# Patient Record
Sex: MALE | Race: WHITE | NOT HISPANIC OR LATINO | Employment: OTHER | URBAN - METROPOLITAN AREA
[De-identification: names, ages, dates, MRNs, and addresses within clinical notes are randomized per-mention and may not be internally consistent; named-entity substitution may affect disease eponyms.]

---

## 2018-01-04 ENCOUNTER — OFFICE VISIT CONVERTED (OUTPATIENT)
Dept: CARDIOLOGY | Facility: CLINIC | Age: 68
End: 2018-01-04
Attending: INTERNAL MEDICINE

## 2018-02-08 ENCOUNTER — OFFICE VISIT CONVERTED (OUTPATIENT)
Dept: CARDIOLOGY | Facility: CLINIC | Age: 68
End: 2018-02-08
Attending: INTERNAL MEDICINE

## 2018-02-16 ENCOUNTER — CONVERSION ENCOUNTER (OUTPATIENT)
Dept: CARDIOLOGY | Facility: CLINIC | Age: 68
End: 2018-02-16

## 2018-02-16 ENCOUNTER — OFFICE VISIT CONVERTED (OUTPATIENT)
Dept: CARDIOLOGY | Facility: CLINIC | Age: 68
End: 2018-02-16
Attending: INTERNAL MEDICINE

## 2018-03-02 ENCOUNTER — OFFICE VISIT CONVERTED (OUTPATIENT)
Dept: CARDIOLOGY | Facility: CLINIC | Age: 68
End: 2018-03-02
Attending: INTERNAL MEDICINE

## 2018-06-01 ENCOUNTER — OFFICE VISIT CONVERTED (OUTPATIENT)
Dept: CARDIOLOGY | Facility: CLINIC | Age: 68
End: 2018-06-01
Attending: INTERNAL MEDICINE

## 2018-11-02 ENCOUNTER — OFFICE VISIT CONVERTED (OUTPATIENT)
Dept: CARDIOLOGY | Facility: CLINIC | Age: 68
End: 2018-11-02
Attending: INTERNAL MEDICINE

## 2019-02-05 ENCOUNTER — OFFICE VISIT CONVERTED (OUTPATIENT)
Dept: CARDIOLOGY | Facility: CLINIC | Age: 69
End: 2019-02-05
Attending: INTERNAL MEDICINE

## 2019-02-06 ENCOUNTER — CONVERSION ENCOUNTER (OUTPATIENT)
Dept: CARDIOLOGY | Facility: CLINIC | Age: 69
End: 2019-02-06

## 2019-03-27 ENCOUNTER — OFFICE VISIT CONVERTED (OUTPATIENT)
Dept: SURGERY | Facility: CLINIC | Age: 69
End: 2019-03-27
Attending: NURSE PRACTITIONER

## 2019-04-08 ENCOUNTER — CONVERSION ENCOUNTER (OUTPATIENT)
Dept: ORTHOPEDIC SURGERY | Facility: CLINIC | Age: 69
End: 2019-04-08

## 2019-04-08 ENCOUNTER — OFFICE VISIT CONVERTED (OUTPATIENT)
Dept: ORTHOPEDIC SURGERY | Facility: CLINIC | Age: 69
End: 2019-04-08
Attending: ORTHOPAEDIC SURGERY

## 2019-05-16 ENCOUNTER — OFFICE VISIT CONVERTED (OUTPATIENT)
Dept: NEUROLOGY | Facility: CLINIC | Age: 69
End: 2019-05-16
Attending: PSYCHIATRY & NEUROLOGY

## 2019-05-22 ENCOUNTER — CONVERSION ENCOUNTER (OUTPATIENT)
Dept: ORTHOPEDIC SURGERY | Facility: CLINIC | Age: 69
End: 2019-05-22

## 2019-05-22 ENCOUNTER — OFFICE VISIT CONVERTED (OUTPATIENT)
Dept: ORTHOPEDIC SURGERY | Facility: CLINIC | Age: 69
End: 2019-05-22
Attending: PHYSICIAN ASSISTANT

## 2019-06-19 ENCOUNTER — HOSPITAL ENCOUNTER (OUTPATIENT)
Dept: PHYSICAL THERAPY | Facility: CLINIC | Age: 69
Setting detail: RECURRING SERIES
Discharge: STILL ADMITTED OR ADMINISTRATIVE | End: 2019-08-30

## 2019-07-26 ENCOUNTER — HOSPITAL ENCOUNTER (OUTPATIENT)
Dept: SURGERY | Facility: HOSPITAL | Age: 69
Setting detail: HOSPITAL OUTPATIENT SURGERY
Discharge: HOME | End: 2019-07-26
Attending: SURGERY

## 2019-07-26 LAB — GLUCOSE BLD-MCNC: 145 MG/DL (ref 70–99)

## 2019-08-15 ENCOUNTER — OFFICE VISIT CONVERTED (OUTPATIENT)
Dept: CARDIOLOGY | Facility: CLINIC | Age: 69
End: 2019-08-15
Attending: INTERNAL MEDICINE

## 2019-09-04 ENCOUNTER — HOSPITAL ENCOUNTER (OUTPATIENT)
Dept: PHYSICAL THERAPY | Facility: CLINIC | Age: 69
Setting detail: RECURRING SERIES
Discharge: STILL ADMITTED OR ADMINISTRATIVE | End: 2019-10-01

## 2019-09-18 ENCOUNTER — HOSPITAL ENCOUNTER (OUTPATIENT)
Dept: PHYSICAL THERAPY | Facility: CLINIC | Age: 69
Setting detail: RECURRING SERIES
Discharge: STILL ADMITTED OR ADMINISTRATIVE | End: 2019-12-20

## 2019-12-23 ENCOUNTER — HOSPITAL ENCOUNTER (OUTPATIENT)
Dept: PHYSICAL THERAPY | Facility: CLINIC | Age: 69
Setting detail: RECURRING SERIES
Discharge: STILL ADMITTED OR ADMINISTRATIVE | End: 2020-01-07

## 2020-01-07 ENCOUNTER — HOSPITAL ENCOUNTER (OUTPATIENT)
Dept: PHYSICAL THERAPY | Facility: CLINIC | Age: 70
Setting detail: RECURRING SERIES
Discharge: HOME | End: 2020-07-01

## 2020-03-05 ENCOUNTER — OFFICE VISIT CONVERTED (OUTPATIENT)
Dept: CARDIOLOGY | Facility: CLINIC | Age: 70
End: 2020-03-05
Attending: INTERNAL MEDICINE

## 2020-09-21 ENCOUNTER — OFFICE VISIT CONVERTED (OUTPATIENT)
Dept: CARDIOLOGY | Facility: CLINIC | Age: 70
End: 2020-09-21
Attending: INTERNAL MEDICINE

## 2021-04-22 ENCOUNTER — CONVERSION ENCOUNTER (OUTPATIENT)
Dept: CARDIOLOGY | Facility: CLINIC | Age: 71
End: 2021-04-22

## 2021-04-22 ENCOUNTER — OFFICE VISIT CONVERTED (OUTPATIENT)
Dept: CARDIOLOGY | Facility: CLINIC | Age: 71
End: 2021-04-22
Attending: INTERNAL MEDICINE

## 2021-05-11 NOTE — PROCEDURES
"   Procedure Note      Patient Name: Emmett Pandya   Patient ID: [id number]   Sex: Male   YOB: 1950    Primary Care Provider: Roscoe Harrison Jr. MD   Referring Provider: Roscoe Harrison Jr. MD    Visit Date: April 22, 2021    Provider: Marco Kramer MD   Location: [facility]   Location Address: [address] [place] [address], [address]   [place], [place]  [id number]   Location Phone: [phone]          FINAL REPORT   TRANSTHORACIC ECHOCARDIOGRAM REPORT    Diagnosis: Congestive heart failure, shortness of breath.   Height: 6'0\" Weight: 308 B/P: 158/78 BSA: 2.6   Tech: BNS   MEASUREMENTS:  RVID (Diastole) : RVID. (NORMAL: 0.7 to 2.4 cm max)   LVID (Systole): 3.9 cm (Diastole): 6.0 cm. (NORMAL: 3.7 - 5.4 cm)   Posterior Wall Thickness (Diastole): 1.3 cm. (NORMAL: 0.8 - 1.1 cm)   Septal Thickness (Diastole): 1.3 cm. (NORMAL: 0.7 - 1.2 cm)   LAID (Systole): 5.0 cm. (NORMAL: 1.9 - 3.8 cm)   Aortic Root Diameter (Diastole): 3.7 cm. (NORMAL: 2.0 - 3.7 cm)   DOPPLER: Continuous-wave, pulse-wave, and color-flow examination of the mitral, aortic, and tricuspid valves was performed. There was trace mitral regurgitation. Doppler flow velocities were normal. E/A ratio is 1.2. DT= 246 msec. IVRT is 74 msec. E/E' is 13.   COMMENTS:  The patient underwent 2-D, M-Mode, and Doppler examination, including pulse-wave, continuous-wave, and color-flow analysis; the study is technically adequate.   FINDINGS:  AORTIC VALVE: Appeared to be normal. Trileaflet with central closure point. No evidence of any obstruction. No regurgitation.   MITRAL VALVE: Appeared to be normal. No evidence of any obstruction. No regurgitation.   TRICUSPID VALVE: Appeared to be normal.   PULMONIC VALVE: Not well seen.   LEFT ATRIUM: Moderately enlarged. No intracavity masses or clots seen. LA volume index is 32 mL/m2.   AORTIC ROOT: Appeared to be normal in size.   LEFT VENTRICLE: Mild LV enlargement. Normal EF of 55%. Mild to moderate left " ventricular hypertrophy. No focal wall motion abnormalities.   RIGHT ATRIUM: Appeared to be normal; no obvious evidence of intracavity masses or clots.   RIGHT VENTRICLE: Normal size and function.   PERICARDIUM: Unremarkable. No evidence of effusion.   INFERIOR VENA CAVA: Diameter is 2.8 cm.   Fax: 04/27/2021      CONCLUSION:  1.  Normal ejection fraction of 55%.  2.  Mild left ventricular hypertrophy.  3.  Mild LV enlargement.  4.  Moderate left atrial enlargement.  5.  Trace mitral regurgitation.      Marco Kramer MD  /pap                    Electronically Signed by: Candelaria Tristan-, Other -Author on April 27, 2021 02:33:05 PM  Electronically Co-signed by: Marco Kramer MD -Reviewer on April 28, 2021 12:56:17 PM

## 2021-05-13 NOTE — PROGRESS NOTES
Progress Note      Patient Name: Emmett Pandya   Patient ID: [id number]   Sex: Male   YOB: 1950    Primary Care Provider: Roscoe Harrison Jr. MD   Referring Provider: Roscoe Harrison Jr. MD    Visit Date: September 21, 2020    Provider: Marco Kramer MD   [facility]   Location Address: [address] [place] [address], [facility]   [place], [place]  [id number]   Location Phone: [phone]          Chief Complaint  · Coronary artery disease       History Of Present Illness  REFERRING CARE PROVIDER: Roscoe Harrison Jr., MD   Emmett Pandya is a 69 year old /White gentleman with known coronary artery disease with previous stent, hypertension, cardiovascular risk factors, diastolic congestive heart failure with chronic lower-extremity edema issues. He has been having some increased shortness of breath and fatigue issues. The patient has chronic edema, which he states is stable. His weight is up about 3 pounds.   PAST MEDICAL HISTORY: Chronic renal insufficiency; diastolic congestive heart failure; coronary artery disease with prior stenting; gout; hyperlipidemia.   FAMILY HISTORY: Negative for diabetes, hypertension and heart disease.   PSYCHOSOCIAL HISTORY: No history of mood changes or depression. He rarely drinks alcohol. He previously used tobacco but quit.   CURRENT MEDICATIONS: include Coreg 12.5 mg b.i.d; Lasix 80 mg b.i.d.; Spironolactone 50 mg daily; Atorvastatin 40 mg daily; Lisinopril 40 mg daily; ASA 81 mg daily. The dosage and frequency of the medications were reviewed with the patient.       Review of Systems  · Cardiovascular  o Admits  o : swelling (feet, ankles, hands), shortness of breath while walking or lying flat  o Denies  o : palpitations (fast, fluttering, or skipping beats), chest pain or angina pectoris   · Respiratory  o Denies  o : chronic or frequent cough, asthma or wheezing      Vitals  Date Time BP Position Site L\R Cuff Size HR RR TEMP (F) WT  HT  BMI  kg/m2 BSA m2 O2 Sat        09/21/2020 12:57 /78 Sitting    64 - R   308lbs 0oz 6'   41.77 2.66     09/21/2020 12:57 /84 Sitting    60 - R                 Physical Examination  · Constitutional  o Appearance  o : Awake, alert, in no acute distress.   · Eyes  o Conjunctivae  o : Normal.  · Ears, Nose, Mouth and Throat  o Oral Cavity  o :   § Oral Mucosa  § : Normal.  · Neck  o Inspection/Palpation  o : No JVD. Good carotid upstroke. No thyromegaly.  · Respiratory  o Respiratory  o : Good respiratory effort. Clear to percussion and auscultation.  · Cardiovascular  o Heart  o :   § Auscultation of Heart  § : S1, S2 normal. Regular rate and rhythm without murmurs, gallops, or rubs.  o Peripheral Vascular System  o :   § Extremities  § : Good femoral and pedal pulses. +2 bilateral lower-extremity edema.  · Gastrointestinal  o Abdominal Examination  o : Soft. No tenderness or masses felt. No hepatosplenomegaly. Abdominal aorta is not palpable.          Assessment     ASSESSMENT AND PLAN:    1.  Diastolic congestive heart failure: Patient with stable weight and edema but some increasing shortness of       breath issues.  Recommended an echocardiogram for further workup.  The patient was concerned about       COVID and other issues and wished to defer this until his follow-up visit.  2.  Hypertension:  Elevated today.  Discussed with him adding an anti-hypertensive.  Patient was adamant that       at his recent IV fusion, his blood pressures have remained well controlled and that this was just white-coat       hypertension.  Counseled him again on a low-sodium diet.  Also gave him a blood pressure log to return for       review.    MD Chad Floyd/chhaya         This note was transcribed by Alma High.  [email or web address]/chad   The above service was transcribed by Alma High, and I attest to the accuracy of the note.  CHAD.               Electronically Signed by: Alma High-, -Author on September 25, 2020  05:43:08 AM  Electronically Co-signed by: Marco Kramer MD -Reviewer on September 29, 2020 01:37:46 PM

## 2021-05-14 VITALS
BODY MASS INDEX: 41.72 KG/M2 | HEIGHT: 72 IN | SYSTOLIC BLOOD PRESSURE: 158 MMHG | WEIGHT: 308 LBS | HEART RATE: 64 BPM | DIASTOLIC BLOOD PRESSURE: 78 MMHG

## 2021-05-14 VITALS
SYSTOLIC BLOOD PRESSURE: 168 MMHG | BODY MASS INDEX: 42.39 KG/M2 | HEIGHT: 72 IN | WEIGHT: 313 LBS | HEART RATE: 64 BPM | DIASTOLIC BLOOD PRESSURE: 82 MMHG

## 2021-05-14 NOTE — PROGRESS NOTES
Progress Note      Patient Name: Emmett Pandya   Patient ID: [id number]   Sex: Male   YOB: 1950    Primary Care Provider: Roscoe Harrison Jr. MD   Referring Provider: Roscoe Harrison Jr. MD    Visit Date: April 22, 2021    Provider: Marco Kramer MD   Location: [facility]   Location Address: [address] [place] [address], [facility]   [place], [place]  [id number]   Location Phone: [phone]          Chief Complaint     Congestive heart failure.       History Of Present Illness  REFERRING CARE PROVIDER: Roscoe Harrison Jr., MD   Emmett Pandya is a 70 year old /White male with CAD with previous stent, hypertension, diastolic congestive heart failure, and chronic lower extremity edema who has noticing some increased shortness of breath, especially with exertion, PND and orthopnea symptoms. he has had chronic lower extremity edema. He has had about a 5-pound weight gain since last visit.   PAST MEDICAL HISTORY: Chronic renal insufficiency; Diastolic congestive heart failure; Coronary artery disease with prior stenting; Gout; Hyperlipidemia.   PSYCHOSOCIAL HISTORY: Previously smoked but quit about 20 years ago. Denies alcohol use.   CURRENT MEDICATIONS: Spironolactone 50 mg daily; lisinopril 40 mg d; d furosemide 80 mg b.i.d.; carvedilol 12.5 mg b.i.d.; atorvastatin 40 mg daily.      ALLERGIES: No known drug allergies.       Review of Systems  · Cardiovascular  o Admits  o : shortness of breath while walking or lying flat  o Denies  o : palpitations (fast, fluttering, or skipping beats), swelling (feet, ankles, hands), chest pain or angina pectoris   · Respiratory  o Denies  o : chronic or frequent cough      Vitals  Date Time BP Position Site L\R Cuff Size HR RR TEMP (F) WT  HT  BMI kg/m2 BSA m2 O2 Sat FR L/min FiO2 HC       04/22/2021 01:10 /82 Sitting    64 - R   312lbs 16oz 6'   42.45 2.69              His home blood pressure log shows blood pressures primarily in the 140s to 150s  range.       Physical Examination  · Constitutional  o Appearance  o : Awake, alert, in no acute distress.   · Eyes  o Conjunctivae  o : Normal.  · Ears, Nose, Mouth and Throat  o Oral Cavity  o :   § Oral Mucosa  § : Normal.  · Neck  o Inspection/Palpation  o : No JVD. Good carotid upstroke. No thyromegaly.  · Respiratory  o Respiratory  o : Good respiratory effort. Clear to percussion and auscultation.  · Cardiovascular  o Heart  o :   § Auscultation of Heart  § : S1, S2 normal. Regular rate and rhythm without murmurs, gallops, or rubs.  o Peripheral Vascular System  o :   § Extremities  § : +2 bilateral lower extremity edema.   · Gastrointestinal  o Abdominal Examination  o : Soft. No tenderness or masses felt. No hepatosplenomegaly. Abdominal aorta is not palpable.          Assessment     ASSESSMENT & PLAN:    1.  Diastolic congestive heart failure with evidence of decompensation on exam as well as by symptoms.         Recommend Zaroxolyn 5 mg for three days and then go to p.r.n. dosing afterwards.  Continue with his        other diuretics.  Will see if further titration needed in the future.  2.  Hypertension.  Patient with mild elevation today in the office.  Recommended keeping a home blood        pressure log after further diuresis.  Continue with lisinopril 40 mg daily, Coreg 12.5 mg b.i.d., and        spironolactone 50 mg daily.             Electronically Signed by: Marce Pearson-, Other -Author on April 27, 2021 01:30:24 PM  Electronically Co-signed by: Marco Kramer MD -Reviewer on April 28, 2021 12:52:47 PM

## 2021-05-15 VITALS
WEIGHT: 298 LBS | DIASTOLIC BLOOD PRESSURE: 82 MMHG | HEIGHT: 72 IN | HEART RATE: 82 BPM | BODY MASS INDEX: 40.36 KG/M2 | SYSTOLIC BLOOD PRESSURE: 144 MMHG

## 2021-05-15 VITALS
HEART RATE: 84 BPM | HEIGHT: 71 IN | SYSTOLIC BLOOD PRESSURE: 190 MMHG | DIASTOLIC BLOOD PRESSURE: 82 MMHG | BODY MASS INDEX: 42.7 KG/M2 | WEIGHT: 305 LBS

## 2021-05-15 VITALS — HEART RATE: 81 BPM | HEIGHT: 72 IN | BODY MASS INDEX: 40.09 KG/M2 | OXYGEN SATURATION: 96 % | WEIGHT: 296 LBS

## 2021-05-15 VITALS — OXYGEN SATURATION: 97 % | WEIGHT: 296 LBS | BODY MASS INDEX: 40.09 KG/M2 | HEIGHT: 72 IN | HEART RATE: 85 BPM

## 2021-05-15 VITALS
WEIGHT: 288 LBS | DIASTOLIC BLOOD PRESSURE: 74 MMHG | BODY MASS INDEX: 40.32 KG/M2 | HEIGHT: 71 IN | HEART RATE: 88 BPM | SYSTOLIC BLOOD PRESSURE: 134 MMHG

## 2021-05-15 VITALS — HEIGHT: 72 IN | WEIGHT: 295.12 LBS | RESPIRATION RATE: 14 BRPM | BODY MASS INDEX: 39.97 KG/M2

## 2021-05-16 VITALS
WEIGHT: 315 LBS | SYSTOLIC BLOOD PRESSURE: 158 MMHG | DIASTOLIC BLOOD PRESSURE: 84 MMHG | HEART RATE: 66 BPM | BODY MASS INDEX: 42.66 KG/M2 | HEIGHT: 72 IN

## 2021-05-16 VITALS
BODY MASS INDEX: 42.66 KG/M2 | HEIGHT: 72 IN | WEIGHT: 315 LBS | SYSTOLIC BLOOD PRESSURE: 160 MMHG | DIASTOLIC BLOOD PRESSURE: 92 MMHG | HEART RATE: 72 BPM

## 2021-05-16 VITALS
DIASTOLIC BLOOD PRESSURE: 82 MMHG | HEART RATE: 68 BPM | HEIGHT: 72 IN | SYSTOLIC BLOOD PRESSURE: 160 MMHG | WEIGHT: 315 LBS | BODY MASS INDEX: 42.66 KG/M2

## 2021-05-16 VITALS
DIASTOLIC BLOOD PRESSURE: 54 MMHG | HEIGHT: 72 IN | WEIGHT: 315 LBS | HEART RATE: 66 BPM | BODY MASS INDEX: 42.66 KG/M2 | SYSTOLIC BLOOD PRESSURE: 124 MMHG

## 2021-05-16 VITALS
BODY MASS INDEX: 42.53 KG/M2 | HEIGHT: 72 IN | DIASTOLIC BLOOD PRESSURE: 74 MMHG | WEIGHT: 314 LBS | HEART RATE: 72 BPM | SYSTOLIC BLOOD PRESSURE: 130 MMHG

## 2021-05-16 VITALS
WEIGHT: 315 LBS | HEIGHT: 72 IN | DIASTOLIC BLOOD PRESSURE: 78 MMHG | SYSTOLIC BLOOD PRESSURE: 154 MMHG | BODY MASS INDEX: 42.66 KG/M2 | HEART RATE: 64 BPM

## 2021-07-14 RX ORDER — SPIRONOLACTONE 50 MG/1
TABLET, FILM COATED ORAL
Qty: 90 TABLET | Refills: 3 | Status: SHIPPED | OUTPATIENT
Start: 2021-07-14 | End: 2023-02-15 | Stop reason: DRUGHIGH

## 2021-09-28 RX ORDER — ATORVASTATIN CALCIUM 40 MG/1
TABLET, FILM COATED ORAL
Qty: 90 TABLET | Refills: 1 | Status: SHIPPED | OUTPATIENT
Start: 2021-09-28

## 2021-12-03 PROBLEM — I25.10 CAD S/P PERCUTANEOUS CORONARY ANGIOPLASTY: Status: ACTIVE | Noted: 2021-12-03

## 2021-12-03 PROBLEM — I10 ESSENTIAL HYPERTENSION: Status: ACTIVE | Noted: 2021-12-03

## 2021-12-03 PROBLEM — E78.5 HYPERLIPIDEMIA LDL GOAL <70: Status: ACTIVE | Noted: 2021-12-03

## 2021-12-03 PROBLEM — I50.32 CHRONIC HEART FAILURE WITH PRESERVED EJECTION FRACTION (HFPEF): Status: ACTIVE | Noted: 2021-12-03

## 2021-12-03 PROBLEM — Z98.61 CAD S/P PERCUTANEOUS CORONARY ANGIOPLASTY: Status: ACTIVE | Noted: 2021-12-03

## 2021-12-03 NOTE — PROGRESS NOTES
"Chief Complaint  Congestive Heart Failure and Hypertension    Subjective    Patient does not report any issues with worsening shortness of breath and no ongoing anginal discomfort    Past Medical History:   Diagnosis Date   • CHF (congestive heart failure) (HCC)    • Chronic kidney disease    • Hyperlipidemia    • Hypertension          Current Outpatient Medications:   •  aspirin 81 MG EC tablet, Take 81 mg by mouth Daily., Disp: , Rfl:   •  atorvastatin (LIPITOR) 40 MG tablet, TAKE 1 TABLET BY MOUTH EVERY DAY, Disp: 90 tablet, Rfl: 1  •  carvedilol (COREG) 12.5 MG tablet, Take 12.5 mg by mouth 2 (Two) Times a Day., Disp: , Rfl:   •  furosemide (LASIX) 80 MG tablet, Take 80 mg by mouth. 2 tabs po q am and 1 po qpm, Disp: , Rfl:   •  spironolactone (ALDACTONE) 50 MG tablet, TAKE 1 TABLET BY MOUTH EVERY DAY IN THE MORNING, Disp: 90 tablet, Rfl: 3  •  vitamin D (ERGOCALCIFEROL) 1.25 MG (12801 UT) capsule capsule, Every 7 (Seven) Days., Disp: , Rfl:   •  olmesartan-hydrochlorothiazide (BENICAR HCT) 40-12.5 MG per tablet, Take 1 tablet by mouth Daily., Disp: 90 tablet, Rfl: 3    Medications Discontinued During This Encounter   Medication Reason   • lisinopril (PRINIVIL,ZESTRIL) 40 MG tablet      No Known Allergies     Social History     Tobacco Use   • Smoking status: Former Smoker     Packs/day: 2.00     Types: Cigarettes     Start date: 1967     Quit date: 12/8/2006     Years since quitting: 15.0   • Smokeless tobacco: Never Used   Vaping Use   • Vaping Use: Never used   Substance Use Topics   • Alcohol use: Never   • Drug use: Never       Family History   Problem Relation Age of Onset   • No Known Problems Mother    • No Known Problems Father         Objective     /72   Pulse 58   Ht 182.9 cm (72\")   Wt (!) 139 kg (306 lb)   BMI 41.50 kg/m²       Physical Exam    General Appearance:   · no acute distress  · Alert and oriented x3  HENT:   · lips not cyanotic  · Atraumatic  Neck:  · No jvd "   · supple  Respiratory:  · no respiratory distress  · normal breath sounds  · no rales  Cardiovascular:  · Regular rate and rhythm  · no S3, no S4   · no murmur  · no rub  Extremities  · No cyanosis  lower extremity edema: +1-2 skin:   · warm, dry  · No rashes      Result Review :     No results found for: PROBNP       Lab Results   Component Value Date    TSH 3.000 08/09/2019      Lab Results   Component Value Date    FREET4 1.0 08/09/2019      No results found for: DDIMERQUANT  Magnesium   Date Value Ref Range Status   08/10/2019 2.24 1.60 - 2.30 mg/dL Final      No results found for: DIGOXIN   Lab Results   Component Value Date    TROPONINT 0.02 08/07/2019             No results found for: POCTROP    Creatinine 1.6   magnesium 2.1                 Diagnoses and all orders for this visit:    1. CAD S/P percutaneous coronary angioplasty (Primary)  Assessment & Plan:  Patient with no angina given chronic aspirin  81 mg once a day      2. Chronic heart failure with preserved ejection fraction (HFpEF) (HCC)  Assessment & Plan:  Patient's weight history to down about 7 pounds since his last visit stable symptomatically continue with Lasix 80 anticoagulopathy milligrams once a day.  Continue to encourage daily weight logs and low-sodium diet      3. Essential hypertension  Assessment & Plan:  Patient with mild systolic elevation will change lisinopril to olmesartan hydrochlorothiazide 40/25 mg and repeat a renal panel in 2 weeks    Orders:  -     Basic Metabolic Panel; Future    4. Hyperlipidemia LDL goal <70  Assessment & Plan:  Continue with Lipitor 40 nightly LDL at goal    Orders:  -     Lipid Panel; Future    Other orders  -     olmesartan-hydrochlorothiazide (BENICAR HCT) 40-12.5 MG per tablet; Take 1 tablet by mouth Daily.  Dispense: 90 tablet; Refill: 3          Follow Up     Return in about 6 months (around 6/8/2022) for EKG with F/U, Follow with Haven Helms.          Patient was given instructions and  counseling regarding his condition or for health maintenance advice. Please see specific information pulled into the AVS if appropriate.

## 2021-12-08 ENCOUNTER — OFFICE VISIT (OUTPATIENT)
Dept: CARDIOLOGY | Facility: CLINIC | Age: 71
End: 2021-12-08

## 2021-12-08 VITALS
WEIGHT: 306 LBS | SYSTOLIC BLOOD PRESSURE: 151 MMHG | DIASTOLIC BLOOD PRESSURE: 72 MMHG | HEART RATE: 58 BPM | HEIGHT: 72 IN | BODY MASS INDEX: 41.45 KG/M2

## 2021-12-08 DIAGNOSIS — E78.5 HYPERLIPIDEMIA LDL GOAL <70: ICD-10-CM

## 2021-12-08 DIAGNOSIS — I10 ESSENTIAL HYPERTENSION: ICD-10-CM

## 2021-12-08 DIAGNOSIS — Z98.61 CAD S/P PERCUTANEOUS CORONARY ANGIOPLASTY: Primary | ICD-10-CM

## 2021-12-08 DIAGNOSIS — I25.10 CAD S/P PERCUTANEOUS CORONARY ANGIOPLASTY: Primary | ICD-10-CM

## 2021-12-08 DIAGNOSIS — I50.32 CHRONIC HEART FAILURE WITH PRESERVED EJECTION FRACTION (HFPEF) (HCC): ICD-10-CM

## 2021-12-08 PROCEDURE — 99214 OFFICE O/P EST MOD 30 MIN: CPT | Performed by: INTERNAL MEDICINE

## 2021-12-08 RX ORDER — LISINOPRIL 40 MG/1
40 TABLET ORAL NIGHTLY
COMMUNITY
Start: 2021-10-09 | End: 2021-12-08

## 2021-12-08 RX ORDER — ERGOCALCIFEROL 1.25 MG/1
CAPSULE ORAL
COMMUNITY
Start: 2021-09-26

## 2021-12-08 RX ORDER — OLMESARTAN MEDOXOMIL AND HYDROCHLOROTHIAZIDE 40/12.5 40; 12.5 MG/1; MG/1
1 TABLET ORAL DAILY
Qty: 90 TABLET | Refills: 3 | Status: SHIPPED | OUTPATIENT
Start: 2021-12-08

## 2021-12-08 RX ORDER — ASPIRIN 81 MG/1
81 TABLET ORAL DAILY
COMMUNITY

## 2021-12-08 RX ORDER — FUROSEMIDE 80 MG
80 TABLET ORAL
COMMUNITY
Start: 2021-09-26

## 2021-12-08 RX ORDER — CARVEDILOL 12.5 MG/1
12.5 TABLET ORAL 2 TIMES DAILY
COMMUNITY
Start: 2021-10-09 | End: 2022-05-26

## 2021-12-08 NOTE — ASSESSMENT & PLAN NOTE
Patient's weight history to down about 7 pounds since his last visit stable symptomatically continue with Lasix 80 anticoagulopathy milligrams once a day.  Continue to encourage daily weight logs and low-sodium diet

## 2021-12-08 NOTE — ASSESSMENT & PLAN NOTE
Patient with mild systolic elevation will change lisinopril to olmesartan hydrochlorothiazide 40/25 mg and repeat a renal panel in 2 weeks

## 2022-05-26 RX ORDER — CARVEDILOL 12.5 MG/1
TABLET ORAL
Qty: 180 TABLET | Refills: 0 | Status: SHIPPED | OUTPATIENT
Start: 2022-05-26 | End: 2022-07-25

## 2022-06-22 ENCOUNTER — OFFICE VISIT (OUTPATIENT)
Dept: CARDIOLOGY | Facility: CLINIC | Age: 72
End: 2022-06-22

## 2022-06-22 VITALS
WEIGHT: 315 LBS | SYSTOLIC BLOOD PRESSURE: 146 MMHG | DIASTOLIC BLOOD PRESSURE: 62 MMHG | HEART RATE: 58 BPM | HEIGHT: 72 IN | BODY MASS INDEX: 42.66 KG/M2

## 2022-06-22 DIAGNOSIS — Z98.61 CAD S/P PERCUTANEOUS CORONARY ANGIOPLASTY: ICD-10-CM

## 2022-06-22 DIAGNOSIS — I50.32 CHRONIC HEART FAILURE WITH PRESERVED EJECTION FRACTION (HFPEF): Primary | ICD-10-CM

## 2022-06-22 DIAGNOSIS — E78.5 HYPERLIPIDEMIA LDL GOAL <70: ICD-10-CM

## 2022-06-22 DIAGNOSIS — I10 ESSENTIAL HYPERTENSION: ICD-10-CM

## 2022-06-22 DIAGNOSIS — I25.10 CAD S/P PERCUTANEOUS CORONARY ANGIOPLASTY: ICD-10-CM

## 2022-06-22 PROBLEM — M10.9 GOUT: Status: ACTIVE | Noted: 2021-12-16

## 2022-06-22 PROBLEM — N20.0 KIDNEY STONES: Status: ACTIVE | Noted: 2022-06-22

## 2022-06-22 PROBLEM — N18.30 STAGE 3 CHRONIC KIDNEY DISEASE (HCC): Status: ACTIVE | Noted: 2021-12-16

## 2022-06-22 PROBLEM — E55.9 VITAMIN D DEFICIENCY: Status: ACTIVE | Noted: 2021-12-16

## 2022-06-22 PROCEDURE — 99214 OFFICE O/P EST MOD 30 MIN: CPT | Performed by: NURSE PRACTITIONER

## 2022-06-22 NOTE — PROGRESS NOTES
Chief Complaint  Coronary Artery Disease    Subjective            History of Present Illness  Emmett Pandya is a 71-year-old white/ male patient who presents to the office today for follow-up.  He has chronic HFpEF, CAD status post stenting, hypertension, and hyperlipidemia.  He reports compliance with all of his medications.  He denies any chest pain, shortness of breath, lightheadedness/dizziness, palpitations, or edema.    PMH  Past Medical History:   Diagnosis Date   • CHF (congestive heart failure) (HCC)    • Chronic heart failure with preserved ejection fraction (HFpEF) 12/3/2021   • Chronic kidney disease    • Hyperlipidemia    • Hypertension          ALLERGY  No Known Allergies       SURGICALHX  Past Surgical History:   Procedure Laterality Date   • CARDIAC CATHETERIZATION     • CORONARY STENT PLACEMENT            SOC  Social History     Socioeconomic History   • Marital status: Single   Tobacco Use   • Smoking status: Former Smoker     Packs/day: 2.00     Types: Cigarettes     Start date: 1967     Quit date: 12/8/2006     Years since quitting: 15.5   • Smokeless tobacco: Never Used   Vaping Use   • Vaping Use: Never used   Substance and Sexual Activity   • Alcohol use: Never   • Drug use: Never   • Sexual activity: Defer         FAMHX  Family History   Problem Relation Age of Onset   • No Known Problems Mother    • No Known Problems Father           MEDSIGONLY  Current Outpatient Medications on File Prior to Visit   Medication Sig   • aspirin 81 MG EC tablet Take 81 mg by mouth Daily.   • atorvastatin (LIPITOR) 40 MG tablet TAKE 1 TABLET BY MOUTH EVERY DAY   • carvedilol (COREG) 12.5 MG tablet TAKE 1 TABLET BY MOUTH TWICE A DAY   • furosemide (LASIX) 80 MG tablet Take 80 mg by mouth. 2 tabs po q am and 1 po qpm   • olmesartan-hydrochlorothiazide (BENICAR HCT) 40-12.5 MG per tablet Take 1 tablet by mouth Daily.   • spironolactone (ALDACTONE) 50 MG tablet TAKE 1 TABLET BY MOUTH EVERY DAY IN THE  "MORNING   • vitamin D (ERGOCALCIFEROL) 1.25 MG (61366 UT) capsule capsule Every 7 (Seven) Days.     No current facility-administered medications on file prior to visit.         Objective   /62   Pulse 58   Ht 182.9 cm (72\")   Wt (!) 144 kg (317 lb)   BMI 42.99 kg/m²       Physical Exam  Constitutional:       Appearance: He is obese.   HENT:      Head: Normocephalic.   Neck:      Vascular: No carotid bruit.   Cardiovascular:      Rate and Rhythm: Normal rate and regular rhythm.      Pulses: Normal pulses.      Heart sounds: Normal heart sounds. No murmur heard.  Pulmonary:      Effort: Pulmonary effort is normal.      Breath sounds: Normal breath sounds.   Musculoskeletal:      Cervical back: Neck supple.      Right lower leg: No edema.      Left lower leg: No edema.   Skin:     General: Skin is dry.      Capillary Refill: Capillary refill takes less than 2 seconds.   Neurological:      Mental Status: He is alert and oriented to person, place, and time.   Psychiatric:         Behavior: Behavior normal.       Result Review :   The following data was reviewed by: LOTUS Murdock on 06/22/2022:  No results found for: PROBNP       Lab Results   Component Value Date    TSH 3.000 08/09/2019      Lab Results   Component Value Date    FREET4 1.0 08/09/2019      No results found for: DDIMERQUANT  Magnesium   Date Value Ref Range Status   08/10/2019 2.24 1.60 - 2.30 mg/dL Final      No results found for: DIGOXIN   Lab Results   Component Value Date    TROPONINT 0.02 08/07/2019 04/22/21 echo:  1.  Normal ejection fraction of 55%.  2.  Mild left ventricular hypertrophy.  3.  Mild LV enlargement.  4.  Moderate left atrial enlargement.  5.  Trace mitral regurgitation.       Assessment and Plan    Diagnoses and all orders for this visit:    1. Chronic heart failure with preserved ejection fraction (HFpEF) (Primary)  Symptomatically stable at this time and euvolemic on exam, continue carvedilol 12.5 mg twice " daily, Lasix 80 mg daily, olmesartan HCTZ 40/12.5 mg daily, and spironolactone 50 mg daily.    2. CAD S/P percutaneous coronary angioplasty  Currently denies any anginal symptoms, continue aspirin 81 mg daily.    3. Essential hypertension  Slightly elevated in office today, reports normal blood pressures at home, continue carvedilol 12.5 mg twice daily, olmesartan HCTZ 40/12.5 mg daily, and spironolactone 50 mg daily.  Low-sodium diet discussed.    4. Hyperlipidemia LDL goal <70  Last lipid panel is not available for review, obtain fasting lipid and hepatic function panel to assess statin effectiveness.  Continue atorvastatin 40 mg nightly for now.          Follow Up   Return in about 6 months (around 12/22/2022) for Follow up with Dr Kramer.    Patient was given instructions and counseling regarding his condition or for health maintenance advice. Please see specific information pulled into the AVS if appropriate.     Emmett Pandya  reports that he quit smoking about 15 years ago. His smoking use included cigarettes. He started smoking about 55 years ago. He smoked 2.00 packs per day. He has never used smokeless tobacco.           Haven Helms, LOTUS  06/22/22  10:34 EDT    Dictated Utilizing Dragon Dictation

## 2022-07-25 RX ORDER — CARVEDILOL 12.5 MG/1
TABLET ORAL
Qty: 180 TABLET | Refills: 2 | Status: SHIPPED | OUTPATIENT
Start: 2022-07-25

## 2023-02-15 ENCOUNTER — OFFICE VISIT (OUTPATIENT)
Dept: CARDIOLOGY | Facility: CLINIC | Age: 73
End: 2023-02-15
Payer: MEDICARE

## 2023-02-15 VITALS
HEIGHT: 72 IN | HEART RATE: 64 BPM | WEIGHT: 304 LBS | DIASTOLIC BLOOD PRESSURE: 80 MMHG | BODY MASS INDEX: 41.17 KG/M2 | SYSTOLIC BLOOD PRESSURE: 138 MMHG

## 2023-02-15 DIAGNOSIS — Z98.61 CAD S/P PERCUTANEOUS CORONARY ANGIOPLASTY: Primary | ICD-10-CM

## 2023-02-15 DIAGNOSIS — I50.32 CHRONIC HEART FAILURE WITH PRESERVED EJECTION FRACTION (HFPEF): ICD-10-CM

## 2023-02-15 DIAGNOSIS — E78.5 HYPERLIPIDEMIA LDL GOAL <70: ICD-10-CM

## 2023-02-15 DIAGNOSIS — I10 ESSENTIAL HYPERTENSION: ICD-10-CM

## 2023-02-15 DIAGNOSIS — I25.10 CAD S/P PERCUTANEOUS CORONARY ANGIOPLASTY: Primary | ICD-10-CM

## 2023-02-15 PROCEDURE — 99214 OFFICE O/P EST MOD 30 MIN: CPT | Performed by: NURSE PRACTITIONER

## 2023-02-15 RX ORDER — ALLOPURINOL 100 MG/1
100 TABLET ORAL DAILY
COMMUNITY

## 2023-02-15 RX ORDER — SPIRONOLACTONE 100 MG/1
100 TABLET, FILM COATED ORAL DAILY
COMMUNITY

## 2023-02-15 NOTE — PROGRESS NOTES
Chief Complaint  Follow-up, Coronary Artery Disease, Hyperlipidemia, and Hypertension    Subjective            History of Present Illness  Emmett Pandya is a 72-year-old white/ male patient who presents to the office today for follow-up.  He has chronic HFpEF, CAD, hypertension, and hyperlipidemia. He reports compliance with medications. He denies chest pain, shortness of breath, edema, lightheadedness/dizziness, or palpitations.  He reports that Dr. Morrell is wanting to start him on Farxiga or Jardiance as a kidney protection measure and is requesting recommendation from cardiology.    [facility]  Past Medical History:   Diagnosis Date   • CHF (congestive heart failure) (HCC)    • Chronic heart failure with preserved ejection fraction (HFpEF) 12/3/2021   • Chronic kidney disease    • Hyperlipidemia    • Hypertension          ALLERGY  No Known Allergies       SURGICALHX  Past Surgical History:   Procedure Laterality Date   • CARDIAC CATHETERIZATION     • CORONARY STENT PLACEMENT            SOC  Social History     Socioeconomic History   • Marital status: Single   Tobacco Use   • Smoking status: Former     Packs/day: 2.00     Types: Cigarettes     Start date:      Quit date: 2006     Years since quittin.2   • Smokeless tobacco: Never   Vaping Use   • Vaping Use: Never used   Substance and Sexual Activity   • Alcohol use: Never   • Drug use: Never   • Sexual activity: Defer         FAMHX  Family History   Problem Relation Age of Onset   • No Known Problems Mother    • No Known Problems Father           MEDSIGONLY  Current Outpatient Medications on File Prior to Visit   Medication Sig   • allopurinol (ZYLOPRIM) 100 MG tablet Take 100 mg by mouth Daily.   • aspirin 81 MG EC tablet Take 81 mg by mouth Daily.   • atorvastatin (LIPITOR) 40 MG tablet TAKE 1 TABLET BY MOUTH EVERY DAY   • carvedilol (COREG) 12.5 MG tablet TAKE 1 TABLET BY MOUTH TWICE A DAY   • furosemide (LASIX) 80 MG tablet Take 80 mg by  "mouth. 2 tabs po q am and 1 po qpm   • olmesartan-hydrochlorothiazide (BENICAR HCT) 40-12.5 MG per tablet Take 1 tablet by mouth Daily.   • spironolactone (ALDACTONE) 100 MG tablet Take 100 mg by mouth Daily.   • vitamin D (ERGOCALCIFEROL) 1.25 MG (33144 UT) capsule capsule Every 7 (Seven) Days.   • [DISCONTINUED] spironolactone (ALDACTONE) 50 MG tablet TAKE 1 TABLET BY MOUTH EVERY DAY IN THE MORNING (Patient taking differently: 100 mg.)     No current facility-administered medications on file prior to visit.       Objective   /80   Pulse 64   Ht 182.9 cm (72\")   Wt (!) 138 kg (304 lb)   BMI 41.23 kg/m²       Physical Exam  Constitutional:       Appearance: He is obese.   HENT:      Head: Normocephalic.   Neck:      Vascular: No carotid bruit.   Cardiovascular:      Rate and Rhythm: Normal rate and regular rhythm.      Pulses: Normal pulses.      Heart sounds: Normal heart sounds. No murmur heard.  Pulmonary:      Effort: Pulmonary effort is normal.      Breath sounds: Normal breath sounds.   Musculoskeletal:      Cervical back: Neck supple.      Right lower leg: No edema.      Left lower leg: No edema.   Skin:     General: Skin is dry.      Capillary Refill: Capillary refill takes less than 2 seconds.   Neurological:      Mental Status: He is alert and oriented to person, place, and time.   Psychiatric:         Behavior: Behavior normal.       Result Review :   The following data was reviewed by: LOTUS Murdock on 02/15/2023:  No results found for: PROBNP       Lab Results   Component Value Date    TSH 3.000 08/09/2019      Lab Results   Component Value Date    FREET4 1.0 08/09/2019      No results found for: DDIMERQUANT  Magnesium   Date Value Ref Range Status   08/10/2019 2.24 1.60 - 2.30 mg/dL Final      No results found for: DIGOXIN   Lab Results   Component Value Date    TROPONINT 0.02 08/07/2019                  Assessment and Plan    Diagnoses and all orders for this visit:    1. CAD S/P " percutaneous coronary angioplasty (Primary)  He denies any anginal symptoms, continue aspirin 81 mg daily.    2. Chronic heart failure with preserved ejection fraction (HFpEF)  Symptomatically stable and only mild fluid overload on exam today, continue carvedilol 12.5 mg twice daily, Lasix 80 mg daily, and spironolactone 100 mg daily.  Nephrology had to the spironolactone dose due to hypokalemia.  I called and spoke with Dr. Morrell and it was decided that Farxiga would be initiated today.  The patient is agreeable to this plan.    3. Essential hypertension  Currently controlled without adverse effects from medication, continue carvedilol 12.5 mg twice daily and olmesartan HCTZ 40-12.5 mg daily.    4. Hyperlipidemia LDL goal <70  Last lipid panel is unavailable for review, will request from PCP.  For now continue atorvastatin 40 mg daily.      Other orders  -     dapagliflozin Propanediol 10 MG tablet; Take 10 mg by mouth Daily.  Dispense: 90 tablet; Refill: 1        Follow Up   Return in about 6 months (around 8/15/2023) for Follow up with Dr Kramer.    Patient was given instructions and counseling regarding his condition or for health maintenance advice. Please see specific information pulled into the AVS if appropriate.     Emmett Pandya  reports that he quit smoking about 16 years ago. His smoking use included cigarettes. He started smoking about 56 years ago. He smoked an average of 2 packs per day. He has never used smokeless tobacco.           Haven Helms, APRN  02/15/23  08:12 EST    Dictated Utilizing Dragon Dictation

## 2023-07-13 ENCOUNTER — HOSPITAL ENCOUNTER (INPATIENT)
Facility: HOSPITAL | Age: 73
LOS: 3 days | Discharge: HOME | DRG: 641 | End: 2023-07-16
Attending: EMERGENCY MEDICINE | Admitting: INTERNAL MEDICINE
Payer: MEDICARE

## 2023-07-13 ENCOUNTER — APPOINTMENT (OUTPATIENT)
Dept: GENERAL RADIOLOGY | Facility: HOSPITAL | Age: 73
DRG: 641 | End: 2023-07-13
Payer: MEDICARE

## 2023-07-13 DIAGNOSIS — N18.9 ACUTE RENAL FAILURE SUPERIMPOSED ON CHRONIC KIDNEY DISEASE, UNSPECIFIED CKD STAGE, UNSPECIFIED ACUTE RENAL FAILURE TYPE: ICD-10-CM

## 2023-07-13 DIAGNOSIS — R11.2 NAUSEA AND VOMITING, UNSPECIFIED VOMITING TYPE: ICD-10-CM

## 2023-07-13 DIAGNOSIS — E87.5 ACUTE HYPERKALEMIA: Primary | ICD-10-CM

## 2023-07-13 DIAGNOSIS — Z78.9 DECREASED ACTIVITIES OF DAILY LIVING (ADL): ICD-10-CM

## 2023-07-13 DIAGNOSIS — R26.2 DIFFICULTY WALKING: ICD-10-CM

## 2023-07-13 DIAGNOSIS — N17.9 ACUTE RENAL FAILURE SUPERIMPOSED ON CHRONIC KIDNEY DISEASE, UNSPECIFIED CKD STAGE, UNSPECIFIED ACUTE RENAL FAILURE TYPE: ICD-10-CM

## 2023-07-13 LAB
ALBUMIN SERPL-MCNC: 3.9 G/DL (ref 3.5–5.2)
ALBUMIN SERPL-MCNC: 4.2 G/DL (ref 3.5–5.2)
ALBUMIN/GLOB SERPL: 1 G/DL
ALBUMIN/GLOB SERPL: 1.1 G/DL
ALP SERPL-CCNC: 100 U/L (ref 39–117)
ALP SERPL-CCNC: 102 U/L (ref 39–117)
ALT SERPL W P-5'-P-CCNC: 13 U/L (ref 1–41)
ALT SERPL W P-5'-P-CCNC: 14 U/L (ref 1–41)
ANION GAP SERPL CALCULATED.3IONS-SCNC: 15.8 MMOL/L (ref 5–15)
ANION GAP SERPL CALCULATED.3IONS-SCNC: 18.5 MMOL/L (ref 5–15)
ARTERIAL PATENCY WRIST A: POSITIVE
AST SERPL-CCNC: 11 U/L (ref 1–40)
AST SERPL-CCNC: 12 U/L (ref 1–40)
BASE EXCESS BLDA CALC-SCNC: -6.8 MMOL/L (ref -2–2)
BASOPHILS # BLD AUTO: 0.04 10*3/MM3 (ref 0–0.2)
BASOPHILS # BLD AUTO: 0.06 10*3/MM3 (ref 0–0.2)
BASOPHILS NFR BLD AUTO: 0.5 % (ref 0–1.5)
BASOPHILS NFR BLD AUTO: 0.6 % (ref 0–1.5)
BDY SITE: ABNORMAL
BILIRUB SERPL-MCNC: 1 MG/DL (ref 0–1.2)
BILIRUB SERPL-MCNC: 1.1 MG/DL (ref 0–1.2)
BUN SERPL-MCNC: 136 MG/DL (ref 8–23)
BUN SERPL-MCNC: 141 MG/DL (ref 8–23)
BUN/CREAT SERPL: 24.2 (ref 7–25)
BUN/CREAT SERPL: 24.8 (ref 7–25)
CALCIUM SPEC-SCNC: 10.6 MG/DL (ref 8.6–10.5)
CALCIUM SPEC-SCNC: 10.8 MG/DL (ref 8.6–10.5)
CHLORIDE SERPL-SCNC: 101 MMOL/L (ref 98–107)
CHLORIDE SERPL-SCNC: 103 MMOL/L (ref 98–107)
CO2 SERPL-SCNC: 14.5 MMOL/L (ref 22–29)
CO2 SERPL-SCNC: 17.2 MMOL/L (ref 22–29)
COHGB MFR BLD: 0 % (ref 0–1.5)
CREAT SERPL-MCNC: 5.48 MG/DL (ref 0.76–1.27)
CREAT SERPL-MCNC: 5.82 MG/DL (ref 0.76–1.27)
DEPRECATED RDW RBC AUTO: 61.8 FL (ref 37–54)
DEPRECATED RDW RBC AUTO: 64.5 FL (ref 37–54)
EGFRCR SERPLBLD CKD-EPI 2021: 10.5 ML/MIN/1.73
EGFRCR SERPLBLD CKD-EPI 2021: 9.8 ML/MIN/1.73
EOSINOPHIL # BLD AUTO: 0.12 10*3/MM3 (ref 0–0.4)
EOSINOPHIL # BLD AUTO: 0.15 10*3/MM3 (ref 0–0.4)
EOSINOPHIL NFR BLD AUTO: 1.2 % (ref 0.3–6.2)
EOSINOPHIL NFR BLD AUTO: 1.8 % (ref 0.3–6.2)
ERYTHROCYTE [DISTWIDTH] IN BLOOD BY AUTOMATED COUNT: 16.9 % (ref 12.3–15.4)
ERYTHROCYTE [DISTWIDTH] IN BLOOD BY AUTOMATED COUNT: 17.2 % (ref 12.3–15.4)
FHHB: 5.6 % (ref 0–5)
GLOBULIN UR ELPH-MCNC: 4 GM/DL
GLOBULIN UR ELPH-MCNC: 4.1 GM/DL
GLUCOSE BLDC GLUCOMTR-MCNC: 146 MG/DL (ref 70–99)
GLUCOSE SERPL-MCNC: 122 MG/DL (ref 65–99)
GLUCOSE SERPL-MCNC: 136 MG/DL (ref 65–99)
HCO3 BLDA-SCNC: 18.7 MMOL/L (ref 22–26)
HCT VFR BLD AUTO: 33.3 % (ref 37.5–51)
HCT VFR BLD AUTO: 34.1 % (ref 37.5–51)
HGB BLD-MCNC: 10.1 G/DL (ref 13–17.7)
HGB BLD-MCNC: 10.3 G/DL (ref 13–17.7)
HGB BLDA-MCNC: 11.3 G/DL (ref 13.8–16.4)
HOLD SPECIMEN: NORMAL
HOLD SPECIMEN: NORMAL
IMM GRANULOCYTES # BLD AUTO: 0.02 10*3/MM3 (ref 0–0.05)
IMM GRANULOCYTES # BLD AUTO: 0.03 10*3/MM3 (ref 0–0.05)
IMM GRANULOCYTES NFR BLD AUTO: 0.2 % (ref 0–0.5)
IMM GRANULOCYTES NFR BLD AUTO: 0.3 % (ref 0–0.5)
INHALED O2 CONCENTRATION: 21 %
LYMPHOCYTES # BLD AUTO: 0.87 10*3/MM3 (ref 0.7–3.1)
LYMPHOCYTES # BLD AUTO: 1.16 10*3/MM3 (ref 0.7–3.1)
LYMPHOCYTES NFR BLD AUTO: 14 % (ref 19.6–45.3)
LYMPHOCYTES NFR BLD AUTO: 8.5 % (ref 19.6–45.3)
MCH RBC QN AUTO: 30.4 PG (ref 26.6–33)
MCH RBC QN AUTO: 30.7 PG (ref 26.6–33)
MCHC RBC AUTO-ENTMCNC: 29.6 G/DL (ref 31.5–35.7)
MCHC RBC AUTO-ENTMCNC: 30.9 G/DL (ref 31.5–35.7)
MCV RBC AUTO: 102.7 FL (ref 79–97)
MCV RBC AUTO: 99.1 FL (ref 79–97)
METHGB BLD QL: 0.1 % (ref 0–1.5)
MODALITY: ABNORMAL
MONOCYTES # BLD AUTO: 0.45 10*3/MM3 (ref 0.1–0.9)
MONOCYTES # BLD AUTO: 0.63 10*3/MM3 (ref 0.1–0.9)
MONOCYTES NFR BLD AUTO: 4.4 % (ref 5–12)
MONOCYTES NFR BLD AUTO: 7.6 % (ref 5–12)
NEUTROPHILS NFR BLD AUTO: 6.3 10*3/MM3 (ref 1.7–7)
NEUTROPHILS NFR BLD AUTO: 75.9 % (ref 42.7–76)
NEUTROPHILS NFR BLD AUTO: 8.71 10*3/MM3 (ref 1.7–7)
NEUTROPHILS NFR BLD AUTO: 85 % (ref 42.7–76)
NRBC BLD AUTO-RTO: 0 /100 WBC (ref 0–0.2)
NRBC BLD AUTO-RTO: 0 /100 WBC (ref 0–0.2)
NT-PROBNP SERPL-MCNC: 195.9 PG/ML (ref 0–900)
OXYHGB MFR BLDV: 94.3 % (ref 94–99)
PCO2 BLDA: 37.2 MM HG (ref 35–45)
PH BLDA: 7.32 PH UNITS (ref 7.35–7.45)
PLATELET # BLD AUTO: 156 10*3/MM3 (ref 140–450)
PLATELET # BLD AUTO: 194 10*3/MM3 (ref 140–450)
PMV BLD AUTO: 11.8 FL (ref 6–12)
PMV BLD AUTO: 12.6 FL (ref 6–12)
PO2 BLD: 371 MM[HG] (ref 0–500)
PO2 BLDA: 77.9 MM HG (ref 80–100)
POTASSIUM SERPL-SCNC: 5.7 MMOL/L (ref 3.5–5.2)
POTASSIUM SERPL-SCNC: 7.2 MMOL/L (ref 3.5–5.2)
PROT SERPL-MCNC: 8 G/DL (ref 6–8.5)
PROT SERPL-MCNC: 8.2 G/DL (ref 6–8.5)
RBC # BLD AUTO: 3.32 10*6/MM3 (ref 4.14–5.8)
RBC # BLD AUTO: 3.36 10*6/MM3 (ref 4.14–5.8)
SAO2 % BLDCOA: 94.4 % (ref 95–99)
SODIUM SERPL-SCNC: 134 MMOL/L (ref 136–145)
SODIUM SERPL-SCNC: 136 MMOL/L (ref 136–145)
TROPONIN T SERPL HS-MCNC: 46 NG/L
WBC NRBC COR # BLD: 10.24 10*3/MM3 (ref 3.4–10.8)
WBC NRBC COR # BLD: 8.3 10*3/MM3 (ref 3.4–10.8)
WHOLE BLOOD HOLD COAG: NORMAL
WHOLE BLOOD HOLD SPECIMEN: NORMAL

## 2023-07-13 PROCEDURE — 25010000002 CALCIUM GLUCONATE-NACL 1-0.675 GM/50ML-% SOLUTION: Performed by: EMERGENCY MEDICINE

## 2023-07-13 PROCEDURE — 85025 COMPLETE CBC W/AUTO DIFF WBC: CPT | Performed by: STUDENT IN AN ORGANIZED HEALTH CARE EDUCATION/TRAINING PROGRAM

## 2023-07-13 PROCEDURE — 25010000002 HEPARIN (PORCINE) PER 1000 UNITS: Performed by: STUDENT IN AN ORGANIZED HEALTH CARE EDUCATION/TRAINING PROGRAM

## 2023-07-13 PROCEDURE — 93010 ELECTROCARDIOGRAM REPORT: CPT | Performed by: INTERNAL MEDICINE

## 2023-07-13 PROCEDURE — 83880 ASSAY OF NATRIURETIC PEPTIDE: CPT

## 2023-07-13 PROCEDURE — 84484 ASSAY OF TROPONIN QUANT: CPT

## 2023-07-13 PROCEDURE — 99223 1ST HOSP IP/OBS HIGH 75: CPT | Performed by: STUDENT IN AN ORGANIZED HEALTH CARE EDUCATION/TRAINING PROGRAM

## 2023-07-13 PROCEDURE — 93005 ELECTROCARDIOGRAM TRACING: CPT | Performed by: EMERGENCY MEDICINE

## 2023-07-13 PROCEDURE — 99285 EMERGENCY DEPT VISIT HI MDM: CPT

## 2023-07-13 PROCEDURE — 85025 COMPLETE CBC W/AUTO DIFF WBC: CPT

## 2023-07-13 PROCEDURE — 80053 COMPREHEN METABOLIC PANEL: CPT

## 2023-07-13 PROCEDURE — 82375 ASSAY CARBOXYHB QUANT: CPT | Performed by: STUDENT IN AN ORGANIZED HEALTH CARE EDUCATION/TRAINING PROGRAM

## 2023-07-13 PROCEDURE — 71045 X-RAY EXAM CHEST 1 VIEW: CPT

## 2023-07-13 PROCEDURE — 82805 BLOOD GASES W/O2 SATURATION: CPT | Performed by: STUDENT IN AN ORGANIZED HEALTH CARE EDUCATION/TRAINING PROGRAM

## 2023-07-13 PROCEDURE — 36415 COLL VENOUS BLD VENIPUNCTURE: CPT | Performed by: STUDENT IN AN ORGANIZED HEALTH CARE EDUCATION/TRAINING PROGRAM

## 2023-07-13 PROCEDURE — 83050 HGB METHEMOGLOBIN QUAN: CPT | Performed by: STUDENT IN AN ORGANIZED HEALTH CARE EDUCATION/TRAINING PROGRAM

## 2023-07-13 PROCEDURE — 93005 ELECTROCARDIOGRAM TRACING: CPT

## 2023-07-13 PROCEDURE — 80053 COMPREHEN METABOLIC PANEL: CPT | Performed by: STUDENT IN AN ORGANIZED HEALTH CARE EDUCATION/TRAINING PROGRAM

## 2023-07-13 PROCEDURE — 93005 ELECTROCARDIOGRAM TRACING: CPT | Performed by: STUDENT IN AN ORGANIZED HEALTH CARE EDUCATION/TRAINING PROGRAM

## 2023-07-13 PROCEDURE — 36600 WITHDRAWAL OF ARTERIAL BLOOD: CPT | Performed by: STUDENT IN AN ORGANIZED HEALTH CARE EDUCATION/TRAINING PROGRAM

## 2023-07-13 PROCEDURE — 63710000001 INSULIN REGULAR HUMAN PER 5 UNITS: Performed by: EMERGENCY MEDICINE

## 2023-07-13 PROCEDURE — 82948 REAGENT STRIP/BLOOD GLUCOSE: CPT

## 2023-07-13 RX ORDER — NITROGLYCERIN 0.4 MG/1
0.4 TABLET SUBLINGUAL
Status: DISCONTINUED | OUTPATIENT
Start: 2023-07-13 | End: 2023-07-16 | Stop reason: HOSPADM

## 2023-07-13 RX ORDER — BUMETANIDE 0.25 MG/ML
2 INJECTION INTRAMUSCULAR; INTRAVENOUS ONCE
Status: COMPLETED | OUTPATIENT
Start: 2023-07-13 | End: 2023-07-13

## 2023-07-13 RX ORDER — SODIUM CHLORIDE 9 MG/ML
75 INJECTION, SOLUTION INTRAVENOUS CONTINUOUS
Status: DISCONTINUED | OUTPATIENT
Start: 2023-07-13 | End: 2023-07-13

## 2023-07-13 RX ORDER — SODIUM CHLORIDE 0.9 % (FLUSH) 0.9 %
10 SYRINGE (ML) INJECTION AS NEEDED
Status: DISCONTINUED | OUTPATIENT
Start: 2023-07-13 | End: 2023-07-16 | Stop reason: HOSPADM

## 2023-07-13 RX ORDER — ONDANSETRON 2 MG/ML
4 INJECTION INTRAMUSCULAR; INTRAVENOUS EVERY 6 HOURS PRN
Status: DISCONTINUED | OUTPATIENT
Start: 2023-07-13 | End: 2023-07-16 | Stop reason: HOSPADM

## 2023-07-13 RX ORDER — AMOXICILLIN 250 MG
2 CAPSULE ORAL 2 TIMES DAILY
Status: DISCONTINUED | OUTPATIENT
Start: 2023-07-13 | End: 2023-07-16 | Stop reason: HOSPADM

## 2023-07-13 RX ORDER — BISACODYL 5 MG/1
5 TABLET, DELAYED RELEASE ORAL DAILY PRN
Status: DISCONTINUED | OUTPATIENT
Start: 2023-07-13 | End: 2023-07-16 | Stop reason: HOSPADM

## 2023-07-13 RX ORDER — FUROSEMIDE 80 MG
80 TABLET ORAL EVERY EVENING
COMMUNITY
End: 2023-07-16 | Stop reason: HOSPADM

## 2023-07-13 RX ORDER — DEXTROSE MONOHYDRATE 25 G/50ML
25 INJECTION, SOLUTION INTRAVENOUS ONCE
Status: COMPLETED | OUTPATIENT
Start: 2023-07-13 | End: 2023-07-13

## 2023-07-13 RX ORDER — SPIRONOLACTONE 50 MG/1
50 TABLET, FILM COATED ORAL DAILY
COMMUNITY
Start: 2023-05-15 | End: 2023-07-16 | Stop reason: HOSPADM

## 2023-07-13 RX ORDER — HEPARIN SODIUM 5000 [USP'U]/ML
5000 INJECTION, SOLUTION INTRAVENOUS; SUBCUTANEOUS EVERY 12 HOURS SCHEDULED
Status: DISCONTINUED | OUTPATIENT
Start: 2023-07-13 | End: 2023-07-16 | Stop reason: HOSPADM

## 2023-07-13 RX ORDER — NICOTINE POLACRILEX 4 MG
15 LOZENGE BUCCAL
Status: DISCONTINUED | OUTPATIENT
Start: 2023-07-13 | End: 2023-07-16 | Stop reason: HOSPADM

## 2023-07-13 RX ORDER — CALCIUM GLUCONATE 20 MG/ML
1000 INJECTION, SOLUTION INTRAVENOUS ONCE
Status: COMPLETED | OUTPATIENT
Start: 2023-07-13 | End: 2023-07-13

## 2023-07-13 RX ORDER — INSULIN LISPRO 100 [IU]/ML
2-7 INJECTION, SOLUTION INTRAVENOUS; SUBCUTANEOUS
Status: DISCONTINUED | OUTPATIENT
Start: 2023-07-13 | End: 2023-07-16 | Stop reason: HOSPADM

## 2023-07-13 RX ORDER — OLMESARTAN MEDOXOMIL 40 MG/1
40 TABLET ORAL DAILY
COMMUNITY
Start: 2023-05-13 | End: 2023-07-16 | Stop reason: HOSPADM

## 2023-07-13 RX ORDER — SODIUM CHLORIDE 9 MG/ML
40 INJECTION, SOLUTION INTRAVENOUS AS NEEDED
Status: DISCONTINUED | OUTPATIENT
Start: 2023-07-13 | End: 2023-07-16 | Stop reason: HOSPADM

## 2023-07-13 RX ORDER — LISINOPRIL 30 MG/1
30 TABLET ORAL DAILY
COMMUNITY
Start: 2023-06-13 | End: 2023-07-16 | Stop reason: HOSPADM

## 2023-07-13 RX ORDER — DEXTROSE MONOHYDRATE 25 G/50ML
25 INJECTION, SOLUTION INTRAVENOUS
Status: DISCONTINUED | OUTPATIENT
Start: 2023-07-13 | End: 2023-07-16 | Stop reason: HOSPADM

## 2023-07-13 RX ORDER — POLYETHYLENE GLYCOL 3350 17 G/17G
17 POWDER, FOR SOLUTION ORAL DAILY PRN
Status: DISCONTINUED | OUTPATIENT
Start: 2023-07-13 | End: 2023-07-16 | Stop reason: HOSPADM

## 2023-07-13 RX ORDER — BISACODYL 10 MG
10 SUPPOSITORY, RECTAL RECTAL DAILY PRN
Status: DISCONTINUED | OUTPATIENT
Start: 2023-07-13 | End: 2023-07-16 | Stop reason: HOSPADM

## 2023-07-13 RX ORDER — SODIUM CHLORIDE 0.9 % (FLUSH) 0.9 %
10 SYRINGE (ML) INJECTION EVERY 12 HOURS SCHEDULED
Status: DISCONTINUED | OUTPATIENT
Start: 2023-07-13 | End: 2023-07-16 | Stop reason: HOSPADM

## 2023-07-13 RX ADMIN — HEPARIN SODIUM 5000 UNITS: 5000 INJECTION INTRAVENOUS; SUBCUTANEOUS at 23:06

## 2023-07-13 RX ADMIN — SODIUM BICARBONATE 50 MEQ: 84 INJECTION INTRAVENOUS at 19:38

## 2023-07-13 RX ADMIN — INSULIN HUMAN 7 UNITS: 100 INJECTION, SOLUTION PARENTERAL at 19:42

## 2023-07-13 RX ADMIN — CALCIUM GLUCONATE 1000 MG: 20 INJECTION, SOLUTION INTRAVENOUS at 19:24

## 2023-07-13 RX ADMIN — DEXTROSE MONOHYDRATE 25 G: 25 INJECTION, SOLUTION INTRAVENOUS at 19:40

## 2023-07-13 RX ADMIN — BUMETANIDE 2 MG: 0.25 INJECTION, SOLUTION INTRAMUSCULAR; INTRAVENOUS at 19:43

## 2023-07-13 RX ADMIN — Medication 10 ML: at 23:13

## 2023-07-13 RX ADMIN — SODIUM CHLORIDE 1000 ML: 9 INJECTION, SOLUTION INTRAVENOUS at 17:43

## 2023-07-13 RX ADMIN — SODIUM ZIRCONIUM CYCLOSILICATE 10 G: 10 POWDER, FOR SUSPENSION ORAL at 19:45

## 2023-07-13 RX ADMIN — SODIUM BICARBONATE 50 MEQ: 84 INJECTION, SOLUTION INTRAVENOUS at 23:12

## 2023-07-13 NOTE — ED PROVIDER NOTES
Time: 7:01 PM EDT  Date of encounter:  7/13/2023  Independent Historian/Clinical History and Information was obtained by:   Patient and Family    History is limited by: N/A    Chief Complaint: Dizziness, weakness, nausea and vomiting      History of Present Illness:  Patient is a 72 y.o. year old male with history of chronic kidney disease who presents to the emergency department for evaluation of weakness and dizziness and lightheadedness and also nausea and vomiting feeling poorly for the past week or so.    He was started on metformin a couple weeks ago and states he had to actually stop taking it because he feels like it was making him get sick.    Denies any fevers.  No chest pains.  No abdominal pain    Still making urine.    HPI    Patient Care Team  Primary Care Provider: Roscoe Harrison Jr., MD    Past Medical History:     No Known Allergies  Past Medical History:   Diagnosis Date    CHF (congestive heart failure)     Chronic heart failure with preserved ejection fraction (HFpEF) 12/3/2021    Chronic kidney disease     Hyperlipidemia     Hypertension      Past Surgical History:   Procedure Laterality Date    CARDIAC CATHETERIZATION      CORONARY STENT PLACEMENT       Family History   Problem Relation Age of Onset    No Known Problems Mother     No Known Problems Father        Home Medications:  Prior to Admission medications    Medication Sig Start Date End Date Taking? Authorizing Provider   allopurinol (ZYLOPRIM) 100 MG tablet Take 100 mg by mouth Daily.    Provider, MD Manny   aspirin 81 MG EC tablet Take 81 mg by mouth Daily.    Provider, MD Manny   atorvastatin (LIPITOR) 40 MG tablet TAKE 1 TABLET BY MOUTH EVERY DAY 9/28/21   Haven Helms APRN   carvedilol (COREG) 12.5 MG tablet TAKE 1 TABLET BY MOUTH TWICE A DAY 7/25/22   Marco Kramer MD   dapagliflozin Propanediol 10 MG tablet Take 10 mg by mouth Daily. 2/15/23   Haven Helms APRN   furosemide (LASIX) 80 MG tablet  "Take 80 mg by mouth. 2 tabs po q am and 1 po qpm 21   ProviderManny MD   olmesartan-hydrochlorothiazide (BENICAR HCT) 40-12.5 MG per tablet Take 1 tablet by mouth Daily. 21   Marco Kramer MD   spironolactone (ALDACTONE) 100 MG tablet Take 100 mg by mouth Daily.    Manny Bello MD   vitamin D (ERGOCALCIFEROL) 1.25 MG (12852 UT) capsule capsule Every 7 (Seven) Days. 21   Manny Bello MD        Social History:   Social History     Tobacco Use    Smoking status: Former     Packs/day: 2.00     Types: Cigarettes     Start date:      Quit date: 2006     Years since quittin.6    Smokeless tobacco: Never   Vaping Use    Vaping Use: Never used   Substance Use Topics    Alcohol use: Never    Drug use: Never         Review of Systems:  Review of Systems   I performed a 10 point review of systems which was all negative, except for the positives found in the HPI above.      Physical Exam:  /43   Pulse 60   Temp 97.4 °F (36.3 °C) (Oral)   Resp 18   Ht 182.9 cm (72\")   Wt 129 kg (285 lb 0.9 oz)   SpO2 95%   BMI 38.66 kg/m²         Physical Exam   General: Awake alert and in mild distress, looks to be feeling generally uncomfortable but not in any pain    HEENT: Head normocephalic atraumatic, eyes PERRLA EOMI, nose normal, oropharynx normal.    Neck: Supple full range of motion, no meningismus, no lymphadenopathy    Heart: Regular rate and rhythm, no murmurs or rubs, 2+ radial pulses bilaterally    Lungs: Clear to auscultation bilaterally without wheezes or crackles, no respiratory distress    Abdomen: Soft, nontender, nondistended, no rebound or guarding    Skin: Warm, dry, no rash    Musculoskeletal: Normal range of motion, mild 1+ bilateral lower extremity edema    Neurologic: Oriented x3, no motor deficits no sensory deficits    Psychiatric: Mood appears stable, no psychosis          Procedures:  Procedures      Medical Decision Making:      Comorbidities that " affect care:    Chronic Kidney Disease, Diabetes    External Notes reviewed:    Previous Labs: I reviewed his previous creatinine from a year or 2 ago measuring 2.4, whereas today's creatinine is 5.8, indicative of acute on chronic renal failure.      The following orders were placed and all results were independently analyzed by me:  Orders Placed This Encounter   Procedures    XR Chest 1 View    [facility] Draw    Comprehensive Metabolic Panel    BNP    Single High Sensitivity Troponin T    CBC Auto Differential    NPO Diet NPO Type: Strict NPO    Undress & Gown    Continuous Pulse Oximetry    Vital Signs    Nephrology  (on-call MD unless specified)    Hospitalist (on-call MD unless specified)    Oxygen Therapy- Nasal Cannula; Titrate 1-6 LPM Per SpO2; 90 - 95%    POC Glucose Once    ECG 12 Lead ED Triage Standing Order; SOA    Insert Peripheral IV    CBC & Differential    Green Top (Gel)    Lavender Top    Gold Top - SST    Light Blue Top       Medications Given in the Emergency Department:  Medications   sodium chloride 0.9 % flush 10 mL (has no administration in time range)   bumetanide (BUMEX) injection 2 mg (has no administration in time range)   sodium zirconium cyclosilicate (LOKELMA) pack 10 g (has no administration in time range)   sodium chloride 0.9 % bolus 1,000 mL (0 mL Intravenous Stopped 7/13/23 1934)   sodium bicarbonate injection 8.4% 50 mEq (50 mEq Intravenous Given 7/13/23 1938)   calcium gluconate 1000 Mg/50ml 0.675% NaCl IV SOLN (0 mg Intravenous Stopped 7/13/23 1938)   insulin regular (humuLIN R,novoLIN R) injection 7 Units (7 Units Intravenous Given 7/13/23 1942)   dextrose (D50W) (25 g/50 mL) IV injection 25 g (25 g Intravenous Given 7/13/23 1940)        ED Course:    ED Course as of 07/13/23 1943   Thu Jul 13, 2023 1909 EKG: I interpreted his twelve-lead EKG is normal sinus rhythm at 62 bpm, normal P waves, normal QRS, normal ST segments, mildly peaked T waves. [VS]      ED Course User  Index  [VS] Wm Braga MD       Labs:    Lab Results (last 24 hours)       Procedure Component Value Units Date/Time    CBC & Differential [[id number]]  (Abnormal) Collected: 07/13/23 1615    Specimen: Blood Updated: 07/13/23 1623    Narrative:      The following orders were created for panel order CBC & Differential.  Procedure                               Abnormality         Status                     ---------                               -----------         ------                     CBC Auto Differential[[id number]]        Abnormal            Final result                 Please view results for these tests on the individual orders.    BNP [[id number]]  (Normal) Collected: 07/13/23 1615    Specimen: Blood Updated: 07/13/23 1646     proBNP 195.9 pg/mL     Narrative:      Among patients with dyspnea, NT-proBNP is highly sensitive for the detection of acute congestive heart failure. In addition NT-proBNP of <300 pg/ml effectively rules out acute congestive heart failure with 99% negative predictive value.      CBC Auto Differential [[id number]]  (Abnormal) Collected: 07/13/23 1615    Specimen: Blood Updated: 07/13/23 1623     WBC 10.24 10*3/mm3      RBC 3.36 10*6/mm3      Hemoglobin 10.3 g/dL      Hematocrit 33.3 %      MCV 99.1 fL      MCH 30.7 pg      MCHC 30.9 g/dL      RDW 17.2 %      RDW-SD 61.8 fl      MPV 12.6 fL      Platelets 194 10*3/mm3      Neutrophil % 85.0 %      Lymphocyte % 8.5 %      Monocyte % 4.4 %      Eosinophil % 1.2 %      Basophil % 0.6 %      Immature Grans % 0.3 %      Neutrophils, Absolute 8.71 10*3/mm3      Lymphocytes, Absolute 0.87 10*3/mm3      Monocytes, Absolute 0.45 10*3/mm3      Eosinophils, Absolute 0.12 10*3/mm3      Basophils, Absolute 0.06 10*3/mm3      Immature Grans, Absolute 0.03 10*3/mm3      nRBC 0.0 /100 WBC     POC Glucose Once [[id number]]  (Abnormal) Collected: 07/13/23 1617    Specimen: Blood Updated: 07/13/23 1618     Glucose 146 mg/dL      Comment: Serial Number:  [id number]:  [id number]       Comprehensive Metabolic Panel [[id number]]  (Abnormal) Collected: 07/13/23 1733    Specimen: Blood Updated: 07/13/23 1843     Glucose 136 mg/dL       mg/dL      Creatinine 5.82 mg/dL      Sodium 134 mmol/L      Potassium 7.2 mmol/L      Chloride 101 mmol/L      CO2 17.2 mmol/L      Calcium 10.6 mg/dL      Total Protein 8.2 g/dL      Albumin 4.2 g/dL      ALT (SGPT) 13 U/L      AST (SGOT) 11 U/L      Alkaline Phosphatase 102 U/L      Total Bilirubin 1.0 mg/dL      Globulin 4.0 gm/dL      A/G Ratio 1.1 g/dL      BUN/Creatinine Ratio 24.2     Anion Gap 15.8 mmol/L      eGFR 9.8 mL/min/1.73      Comment: <15 Indicative of kidney failure       Narrative:      GFR Normal >60  Chronic Kidney Disease <60  Kidney Failure <15      Single High Sensitivity Troponin T [[id number]]  (Abnormal) Collected: 07/13/23 1733    Specimen: Blood Updated: 07/13/23 1827     HS Troponin T 46 ng/L     Narrative:      High Sensitive Troponin T Reference Range:  <10.0 ng/L- Negative Female for AMI  <15.0 ng/L- Negative Male for AMI  >=10 - Abnormal Female indicating possible myocardial injury.  >=15 - Abnormal Male indicating possible myocardial injury.   Clinicians would have to utilize clinical acumen, EKG, Troponin, and serial changes to determine if it is an Acute Myocardial Infarction or myocardial injury due to an underlying chronic condition.                  Imaging:    XR Chest 1 View    Result Date: 7/13/2023  PROCEDURE: XR CHEST 1 VW  COMPARISON: [facility], CR, CHEST AP/PA 1 VIEW, 8/07/2019, 10:58.  INDICATIONS: SHORTNESS OF BREATH  FINDINGS:  Lungs are clear bilaterally. Cardiac and mediastinal contours within normal limits. Regional skeleton within normal limits for age.         1. No acute cardiopulmonary disease.       GAYATHRI RONDON MD       Electronically Signed and Approved By: GAYATHRI RONDON MD on 7/13/2023 at 16:55                Differential Diagnosis and  Discussion:    Vomiting: Differential diagnosis includes but is not limited to migraine, labyrinthine disorders, psychogenic, metabolic and endocrine causes, peptic ulcer, gastric outlet obstruction, gastritis, gastroenteritis, appendicitis, intestinal obstruction, paralytic ileus, food poisoning, cholecystitis, acute hepatitis, acute pancreatitis, acute febrile illness, and myocardial infarction.  Weakness: Based on the patient's history, signs, and symptoms, the diffential diagnosis includes but is not limited to meningitis, stroke, sepsis, subarachnoid hemorrhage, intracranial bleeding, encephalitis, acute uti, dehydration, MS, myasthenia gravis, Guillan [place], migraine variant, neuromuscular disorders vertigo, electrolyte imbalance, and metabolic disorders.    All labs were reviewed and interpreted by me.  All X-rays impressions were independently interpreted by me.  EKG was interpreted by me.    MDM     Amount and/or Complexity of Data Reviewed  Decide to obtain previous medical records or to obtain history from someone other than the patient: yes           This patient is a 72-year-old male with chronic kidney disease recently started on metformin a couple weeks ago now presents with nausea vomiting and feeling generally weak and dizzy.    He is noted to be in acute on chronic renal failure with creatinine of 5.8 and BUN greater than 100 and some metabolic acidosis, and his serum potassium was significantly elevated at 7.2 today.    He does have some mild EKG changes consistent with hyperkalemia as well.    I am treating him emergently for his hyperkalemia with IV calcium gluconate, sodium bicarbonate, insulin and dextrose, Bumex and Lokelma.    I have been in consultation with the patient's nephrologist and we will plan to admit him to a monitored bed today, possibly start him out in the ICU initially.            Critical Care Note: Total Critical Care time of 35 minutes. Total critical care time documented  does not include time spent on separately billed procedures for services of nurses or physician assistants. I personally saw and examined the patient. I have reviewed all diagnostic interpretations and treatment plans as written. I was present for the key portions of any procedures performed and the inclusive time noted in any critical care statement. Critical care time includes patient management by me, time spent at the patients bedside,  time to review lab and imaging results, discussing patient care, documentation in the medical record, and time spent with family or caregiver.    Patient Care Considerations:          Consultants/Shared Management Plan:    Hospitalist: I have discussed the case with the admitting hospitalist, who agrees to accept the patient for admission.  Consultant: I have discussed the case with patient's nephrologist, who agrees to consult on the patient.    Social Determinants of Health:    Patient is independent, reliable, and has access to care.       Disposition and Care Coordination:    Admit:   Through independent evaluation of the patient's history, physical, and imperical data, the patient meets criteria for observation/admission to the hospital.        Final diagnoses:   Acute hyperkalemia   Acute renal failure superimposed on chronic kidney disease, unspecified CKD stage, unspecified acute renal failure type   Nausea and vomiting, unspecified vomiting type        ED Disposition       ED Disposition   Decision to Admit    Condition   --    Comment   --               This medical record created using voice recognition software.             Wm Braga MD  07/13/23 3023

## 2023-07-14 LAB
ALBUMIN SERPL-MCNC: 4.1 G/DL (ref 3.5–5.2)
ALBUMIN/GLOB SERPL: 1 G/DL
ALP SERPL-CCNC: 104 U/L (ref 39–117)
ALT SERPL W P-5'-P-CCNC: 12 U/L (ref 1–41)
ANION GAP SERPL CALCULATED.3IONS-SCNC: 17.6 MMOL/L (ref 5–15)
AST SERPL-CCNC: 11 U/L (ref 1–40)
BASOPHILS # BLD AUTO: 0.05 10*3/MM3 (ref 0–0.2)
BASOPHILS NFR BLD AUTO: 0.4 % (ref 0–1.5)
BILIRUB SERPL-MCNC: 1.1 MG/DL (ref 0–1.2)
BUN SERPL-MCNC: 133 MG/DL (ref 8–23)
BUN/CREAT SERPL: 26.1 (ref 7–25)
CALCIUM SPEC-SCNC: 10.6 MG/DL (ref 8.6–10.5)
CHLORIDE SERPL-SCNC: 101 MMOL/L (ref 98–107)
CO2 SERPL-SCNC: 19.4 MMOL/L (ref 22–29)
CREAT SERPL-MCNC: 5.09 MG/DL (ref 0.76–1.27)
D-LACTATE SERPL-SCNC: 1.1 MMOL/L (ref 0.5–2)
DEPRECATED RDW RBC AUTO: 57.2 FL (ref 37–54)
EGFRCR SERPLBLD CKD-EPI 2021: 11.5 ML/MIN/1.73
EOSINOPHIL # BLD AUTO: 0.25 10*3/MM3 (ref 0–0.4)
EOSINOPHIL NFR BLD AUTO: 2.1 % (ref 0.3–6.2)
ERYTHROCYTE [DISTWIDTH] IN BLOOD BY AUTOMATED COUNT: 16.9 % (ref 12.3–15.4)
GLOBULIN UR ELPH-MCNC: 4.1 GM/DL
GLUCOSE BLDC GLUCOMTR-MCNC: 113 MG/DL (ref 70–99)
GLUCOSE BLDC GLUCOMTR-MCNC: 115 MG/DL (ref 70–99)
GLUCOSE BLDC GLUCOMTR-MCNC: 120 MG/DL (ref 70–99)
GLUCOSE BLDC GLUCOMTR-MCNC: 138 MG/DL (ref 70–99)
GLUCOSE BLDC GLUCOMTR-MCNC: 82 MG/DL (ref 70–99)
GLUCOSE SERPL-MCNC: 121 MG/DL (ref 65–99)
HCT VFR BLD AUTO: 32.8 % (ref 37.5–51)
HGB BLD-MCNC: 10.6 G/DL (ref 13–17.7)
IMM GRANULOCYTES # BLD AUTO: 0.03 10*3/MM3 (ref 0–0.05)
IMM GRANULOCYTES NFR BLD AUTO: 0.2 % (ref 0–0.5)
LYMPHOCYTES # BLD AUTO: 1.31 10*3/MM3 (ref 0.7–3.1)
LYMPHOCYTES NFR BLD AUTO: 10.8 % (ref 19.6–45.3)
MCH RBC QN AUTO: 30.3 PG (ref 26.6–33)
MCHC RBC AUTO-ENTMCNC: 32.3 G/DL (ref 31.5–35.7)
MCV RBC AUTO: 93.7 FL (ref 79–97)
MONOCYTES # BLD AUTO: 0.83 10*3/MM3 (ref 0.1–0.9)
MONOCYTES NFR BLD AUTO: 6.8 % (ref 5–12)
NEUTROPHILS NFR BLD AUTO: 79.7 % (ref 42.7–76)
NEUTROPHILS NFR BLD AUTO: 9.71 10*3/MM3 (ref 1.7–7)
NRBC BLD AUTO-RTO: 0 /100 WBC (ref 0–0.2)
PLATELET # BLD AUTO: 188 10*3/MM3 (ref 140–450)
PMV BLD AUTO: 11.8 FL (ref 6–12)
POTASSIUM SERPL-SCNC: 4.9 MMOL/L (ref 3.5–5.2)
POTASSIUM SERPL-SCNC: 5.4 MMOL/L (ref 3.5–5.2)
POTASSIUM SERPL-SCNC: 5.4 MMOL/L (ref 3.5–5.2)
POTASSIUM SERPL-SCNC: 5.6 MMOL/L (ref 3.5–5.2)
PROT SERPL-MCNC: 8.2 G/DL (ref 6–8.5)
QT INTERVAL: 395 MS
QT INTERVAL: 396 MS
RBC # BLD AUTO: 3.5 10*6/MM3 (ref 4.14–5.8)
SODIUM SERPL-SCNC: 138 MMOL/L (ref 136–145)
WBC NRBC COR # BLD: 12.18 10*3/MM3 (ref 3.4–10.8)

## 2023-07-14 PROCEDURE — 99233 SBSQ HOSP IP/OBS HIGH 50: CPT | Performed by: INTERNAL MEDICINE

## 2023-07-14 PROCEDURE — 97161 PT EVAL LOW COMPLEX 20 MIN: CPT

## 2023-07-14 PROCEDURE — 80053 COMPREHEN METABOLIC PANEL: CPT | Performed by: STUDENT IN AN ORGANIZED HEALTH CARE EDUCATION/TRAINING PROGRAM

## 2023-07-14 PROCEDURE — 97165 OT EVAL LOW COMPLEX 30 MIN: CPT

## 2023-07-14 PROCEDURE — 25010000002 HEPARIN (PORCINE) PER 1000 UNITS: Performed by: STUDENT IN AN ORGANIZED HEALTH CARE EDUCATION/TRAINING PROGRAM

## 2023-07-14 PROCEDURE — 99291 CRITICAL CARE FIRST HOUR: CPT | Performed by: INTERNAL MEDICINE

## 2023-07-14 PROCEDURE — 36415 COLL VENOUS BLD VENIPUNCTURE: CPT | Performed by: STUDENT IN AN ORGANIZED HEALTH CARE EDUCATION/TRAINING PROGRAM

## 2023-07-14 PROCEDURE — 85025 COMPLETE CBC W/AUTO DIFF WBC: CPT | Performed by: STUDENT IN AN ORGANIZED HEALTH CARE EDUCATION/TRAINING PROGRAM

## 2023-07-14 PROCEDURE — 82948 REAGENT STRIP/BLOOD GLUCOSE: CPT

## 2023-07-14 PROCEDURE — 84132 ASSAY OF SERUM POTASSIUM: CPT | Performed by: STUDENT IN AN ORGANIZED HEALTH CARE EDUCATION/TRAINING PROGRAM

## 2023-07-14 PROCEDURE — 83605 ASSAY OF LACTIC ACID: CPT | Performed by: INTERNAL MEDICINE

## 2023-07-14 PROCEDURE — 63710000001 INSULIN REGULAR HUMAN PER 5 UNITS: Performed by: PHYSICIAN ASSISTANT

## 2023-07-14 RX ORDER — DEXTROSE MONOHYDRATE 25 G/50ML
25 INJECTION, SOLUTION INTRAVENOUS ONCE
Status: COMPLETED | OUTPATIENT
Start: 2023-07-14 | End: 2023-07-14

## 2023-07-14 RX ORDER — ASPIRIN 81 MG/1
81 TABLET ORAL DAILY
Status: DISCONTINUED | OUTPATIENT
Start: 2023-07-14 | End: 2023-07-16 | Stop reason: HOSPADM

## 2023-07-14 RX ORDER — ATORVASTATIN CALCIUM 40 MG/1
40 TABLET, FILM COATED ORAL DAILY
Status: DISCONTINUED | OUTPATIENT
Start: 2023-07-14 | End: 2023-07-16 | Stop reason: HOSPADM

## 2023-07-14 RX ADMIN — DEXTROSE MONOHYDRATE 25 G: 500 INJECTION PARENTERAL at 20:30

## 2023-07-14 RX ADMIN — ASPIRIN 81 MG: 81 TABLET, COATED ORAL at 18:19

## 2023-07-14 RX ADMIN — HEPARIN SODIUM 5000 UNITS: 5000 INJECTION INTRAVENOUS; SUBCUTANEOUS at 12:10

## 2023-07-14 RX ADMIN — SODIUM BICARBONATE 50 MEQ: 84 INJECTION, SOLUTION INTRAVENOUS at 20:30

## 2023-07-14 RX ADMIN — Medication 10 ML: at 20:52

## 2023-07-14 RX ADMIN — ATORVASTATIN CALCIUM 40 MG: 40 TABLET, FILM COATED ORAL at 18:19

## 2023-07-14 RX ADMIN — INSULIN HUMAN 5 UNITS: 100 INJECTION, SOLUTION PARENTERAL at 20:30

## 2023-07-14 RX ADMIN — HEPARIN SODIUM 5000 UNITS: 5000 INJECTION INTRAVENOUS; SUBCUTANEOUS at 20:51

## 2023-07-14 NOTE — PLAN OF CARE
Goal Outcome Evaluation:  Plan of Care Reviewed With: patient        Progress: no change  Outcome Evaluation: Patient does not present with any significant decline in function and does not require inpatient occupational therapy, therefore they will be discharged from services at this time. He performs all functional transfers/mobility independently without a device and all BADLs with SBA or better, stating that his significant other assists hime with LB self-care tasks at baseline. It is recommended he discharge home with assist when medically able to do so. Patient is aware and agreeable with treatment plan at this time.      Anticipated Discharge Disposition (OT): home with assist

## 2023-07-14 NOTE — CONSULTS
Pulmonary / Critical Care Consult Note      Patient Name: Emmett Pandya  : 1950  MRN: [id number]  Primary Care Physician:  Roscoe Harrison Jr., MD  Referring Physician: No ref. provider found  Date of admission: 2023    Subjective   Subjective     Reason for Consult/ Chief Complaint: critical care management    HPI:  Emmett Pandya is a 72 y.o. male past medical history of CKD stage III, hyperlipidemia, hypertension and diabetes mellitus type 2 who presented to the emergency room secondary to weakness, diarrhea, confusion and dizziness.  ER work-up revealed elevated potassium of 7.2 and worsening creatinine with Mean of 5.82.  Patient does take metformin and lisinopril as outpatient.  Patient was admitted to CCU for critical care monitoring.  Pulmonary critical care has been consulted    Review of Systems  Constitutional symptoms:  Weakness; otherwise denied complaints   Ear, nose, throat: Denied complaints  Cardiovascular:  Denied complaints  Respiratory: Denied complaints  Gastrointestinal: Diarrhea; otherwise denied complaints  Musculoskeletal: Denied complaints  Genitourinary: Denied complaints  Allergy / Immunology: Denied complaints  Hematologic: Denied complaints  Neurologic: Dizziness and confusion; otherwise denied complaints  Skin: Denied complaints  Endocrine: Denied complaints  Psychiatric: Denied complaints      Personal History     Past Medical History:   Diagnosis Date    CHF (congestive heart failure)     Chronic heart failure with preserved ejection fraction (HFpEF) 12/3/2021    Chronic kidney disease     Hyperlipidemia     Hypertension        Past Surgical History:   Procedure Laterality Date    CARDIAC CATHETERIZATION      CORONARY STENT PLACEMENT         Family History: family history includes No Known Problems in his father and mother. Otherwise pertinent FHx was reviewed and not pertinent to current issue.    Social History:  reports that he quit smoking about 16 years ago. His  smoking use included cigarettes. He started smoking about 56 years ago. He smoked an average of 2 packs per day. He has never used smokeless tobacco. He reports that he does not drink alcohol and does not use drugs.    Home Medications:  allopurinol, aspirin, atorvastatin, carvedilol, furosemide, lisinopril, metFORMIN, olmesartan, and spironolactone    Allergies:  No Known Allergies    Objective    Objective     Vitals:   Temp:  [97.2 °F (36.2 °C)-97.6 °F (36.4 °C)] 97.6 °F (36.4 °C)  Heart Rate:  [59-86] 76  Resp:  [15-18] 16  BP: ()/() 138/101    Physical Exam:  Vital Signs Reviewed   WDWN, Alert, NAD.    HEENT:  PERRL, EOMI.  OP, nares clear, MMM  Neck:  Supple, no JVD, no thyromegaly  Lymph: no axillary, cervical, supraclavicular lymphadenopathy noted bilaterally  Chest:  good aeration, clear to auscultation bilaterally, tympanic to percussion bilaterally, no work of breathing noted  CV: RRR, no MGR, pulses 2+, equal.  Abd:  Soft, NT, ND, + BS, no HSM  EXT:  no clubbing, no cyanosis, bilateral lower extremity edema, no joint tenderness  Neuro:  A&Ox3, CN grossly intact, no focal deficits.  Skin: No rashes or lesions noted      Result Review    Result Review:  I have personally reviewed the results from the time of this admission to 7/14/2023 13:11 EDT and agree with these findings:  []  Laboratory  []  Microbiology  []  Radiology  []  EKG/Telemetry   []  Cardiology/Vascular   []  Pathology  []  Old records   []  Other:  Most notable findings include:         Lab 07/14/23  1124 07/14/23  0746 07/14/23  0357 07/13/23  2205 07/13/23  1733 07/13/23  1615   WBC  --   --  12.18* 8.30  --  10.24   HEMOGLOBIN  --   --  10.6* 10.1*  --  10.3*   HEMATOCRIT  --   --  32.8* 34.1*  --  33.3*   PLATELETS  --   --  188 156  --  194   SODIUM  --   --  138 136 134*  --    POTASSIUM 4.9 5.4* 5.4* 5.7* 7.2*  --    CHLORIDE  --   --  101 103 101  --    CO2  --   --  19.4* 14.5* 17.2*  --    BUN  --   --  133* 136* 141*   --    CREATININE  --   --  5.09* 5.48* 5.82*  --    GLUCOSE  --   --  121* 122* 136*  --    CALCIUM  --   --  10.6* 10.8* 10.6*  --    TOTAL PROTEIN  --   --  8.2 8.0 8.2  --    ALBUMIN  --   --  4.1 3.9 4.2  --    GLOBULIN  --   --  4.1 4.1 4.0  --          Assessment & Plan   Assessment / Plan     Active Hospital Problems:  Active Hospital Problems    Diagnosis     **Hyperkalemia          Impression:    Life-threatening hyperkalemia at 7.2  DWAIN on CKD  Anemia  Weakness/dizziness likely secondary to above  Nausea and vomiting likely secondary to above  Diabetes mellitus type 2  HTN  HLD      Plan:    Nephrology on board.  Monitoring labs.  Treating medically currently.  No indication for emergent dialysis  On bicarb drip at 100 ML per hour  Hyperkalemia treated with hyperkalemic protocol, repeat potassium 5.4.  Will monitor closely  Trend renal function and electrolytes.  Replace as needed  Hold metformin and lisinopril  On diabetic diet  On SSI to optimize glucose control  Bowel regimen in place    DVT prophylaxis with heparin        Pt is critically ill in ICU with life-threatening hyperkalemia, DWAIN on CKD, anemia, weakness/dizziness/nausea and vomiting, diabetes type 2 with hyperglycemia, HTN, HLD.        I, DUSTIN Montalvo spent 18 minutes in accordance with split shared billing.  Electronically signed by DUSTIN Leach, 07/14/23, 1:21 PM EDT.    Total critical care time spent 48 minutes    I Dr. Jonathan Downing I spent a total of 30 minutes of critical care time caring for this patient    Electronically signed by Jonathan Downing DO, 07/14/23, 3:40 PM EDT.

## 2023-07-14 NOTE — PROGRESS NOTES
[facility]   Hospitalist Progress Note  Date: 2023  Patient Name: Emmett Pandya  : 1950  MRN: [id number]  Date of admission: 2023      Subjective   Subjective     Chief Complaint:   Diarrhea, weakness    Summary:   Emmett Pandya is a 72-year-old male with a past medical history of CKD stage III, hyperlipidemia, hypertension and diabetes mellitus type 2 presenting with weakness.  Patient states roughly 3 weeks ago he was started on metformin.  Since then he has been experiencing diarrhea, weakness, confusion and dizziness.  In ED, blood pressure 108/52, heart rate 65, respiratory of 18, temperature 97.4 and O2 saturation 96% on room air.  Labs on arrival showed a sodium 134, potassium 0.2, BUN 41, creatinine 5.8, glucose 136, WC 10.2, hemoglobin 10.3 and platelet count 194.  He was admitted to the ICU given hyperkalemia and acute renal failure     Interval Followup:   Patient feels well this morning, denies any nausea or vomiting.  No further reports of diarrhea.  Patient's potassium still remains elevated.  Remains on bicarb drip.    Review of Systems   All systems were reviewed and negative except for: Denies any acute symptoms currently    Objective   Objective     Vitals:   Temp:  [97.2 °F (36.2 °C)-97.6 °F (36.4 °C)] 97.4 °F (36.3 °C)  Heart Rate:  [60-86] 65  Resp:  [15-16] 16  BP: ()/() 121/61  Physical Exam    Constitutional: Awake, alert, no acute distress   Eyes: Pupils equal, sclerae anicteric, no conjunctival injection   HENT: NCAT, mucous membranes moist   Neck: Supple, no thyromegaly, no lymphadenopathy, trachea midline   Respiratory: Clear to auscultation bilaterally, nonlabored respirations    Cardiovascular: RRR, no murmurs, rubs, or gallops, palpable pedal pulses bilaterally   Gastrointestinal: Positive bowel sounds, soft, nontender, nondistended   Musculoskeletal: Trace bilateral ankle edema, no clubbing or cyanosis to extremities   Psychiatric: Appropriate  affect, cooperative   Neurologic: Oriented x 3, strength symmetric in all extremities, Cranial Nerves grossly intact to confrontation, speech clear   Skin: No rashes     Result Review    Result Review:  I have personally reviewed the results from 7/14/2023 and agree with these findings:  []  Laboratory  []  Microbiology  []  Radiology  []  EKG/Telemetry   []  Cardiology/Vascular   []  Pathology  []  Old records  []  Other:    Assessment & Plan   Assessment / Plan     Assessment/Plan:  Life-threatening hyperkalemia  DWAIN on CKD stage IIIb  Creatinine 1.8  Metabolic acidosis  Hypercalcemia  Nausea vomiting  Congestive heart failure  CAD  Diabetes    Plan:  Patient remains admitted to the ICU for ongoing care management  Nephrology, pulmonary critical care following  Patient remains on a bicarb drip per nephrology  Hyperkalemia treated, improving  Continuing to monitor renal function  Replacing electrolytes as needed  Sliding scale insulin with hypoglycemia protocol  Holding home metformin and lisinopril  History of coronary artery disease, continue aspirin and statin     Discussed plan with RN, nephrology, intensivist    DVT prophylaxis:  Medical DVT prophylaxis orders are present.    CODE STATUS:   Code Status (Patient has no pulse and is not breathing): CPR (Attempt to Resuscitate)  Medical Interventions (Patient has pulse or is breathing): Full Support

## 2023-07-14 NOTE — THERAPY EVALUATION
Acute Care - Physical Therapy Initial Evaluation   [facility]     Patient Name: Emmtet Pandya  : 1950  MRN: [id number]  Today's Date: 2023      Visit Dx:     ICD-10-CM ICD-9-CM   1. Acute hyperkalemia  E87.5 276.7   2. Acute renal failure superimposed on chronic kidney disease, unspecified CKD stage, unspecified acute renal failure type  N17.9 584.9    N18.9 585.9   3. Nausea and vomiting, unspecified vomiting type  R11.2 787.01   4. Difficulty walking  R26.2 719.7     Patient Active Problem List   Diagnosis    Chronic heart failure with preserved ejection fraction (HFpEF)    CAD S/P percutaneous coronary angioplasty    Hyperlipidemia LDL goal <70    Essential hypertension    Kidney stones    Gout    Stage 3 chronic kidney disease    Vitamin D deficiency    Hyperkalemia     Past Medical History:   Diagnosis Date    CHF (congestive heart failure)     Chronic heart failure with preserved ejection fraction (HFpEF) 12/3/2021    Chronic kidney disease     Hyperlipidemia     Hypertension      Past Surgical History:   Procedure Laterality Date    CARDIAC CATHETERIZATION      CORONARY STENT PLACEMENT       PT Assessment (last 12 hours)       PT Evaluation and Treatment       Row Name 23 1400          Physical Therapy Time and Intention    Document Type evaluation  -AV     Mode of Treatment individual therapy;physical therapy  -AV       Row Name 23 1400          General Information    Patient Profile Reviewed yes  -AV     Prior Level of Function independent:;all household mobility;gait;transfer;ADL's  Ambulated without an assistive device. No home O2.  -AV     Equipment Currently Used at Home none  -AV     Existing Precautions/Restrictions no known precautions/restrictions  -AV       Row Name 23 1400          Living Environment    Current Living Arrangements home  -AV     People in Home significant other  -AV       Row Name 23 1400          Range of Motion (ROM)    Range of Motion  bilateral lower extremities;ROM is WFL  -AV       Row Name 07/14/23 1400          Strength (Manual Muscle Testing)    Strength (Manual Muscle Testing) bilateral lower extremities;strength is WFL  -AV       Row Name 07/14/23 1400          Bed Mobility    Bed Mobility bed mobility (all) activities  -AV     All Activities, Tate (Bed Mobility) independent  -AV       Row Name 07/14/23 1400          Transfers    Transfers sit-stand transfer;stand-sit transfer  -AV       Row Name 07/14/23 1400          Sit-Stand Transfer    Sit-Stand Tate (Transfers) independent  -AV     Assistive Device (Sit-Stand Transfers) --  No AD  -AV       Row Name 07/14/23 1400          Stand-Sit Transfer    Stand-Sit Tate (Transfers) independent  -AV     Assistive Device (Stand-Sit Transfers) --  No AD  -AV       Row Name 07/14/23 1400          Gait/Stairs (Locomotion)    Gait/Stairs Locomotion gait/ambulation independence;gait/ambulation assistive device;distance ambulated  -AV     Tate Level (Gait) independent  -AV     Assistive Device (Gait) --  No AD  -AV     Distance in Feet (Gait) 5  bed to recliner, declined further ambulation  -AV       Row Name 07/14/23 1400          Balance    Balance Assessment standing dynamic balance  -AV     Dynamic Standing Balance independent  -AV     Position/Device Used, Standing Balance unsupported  -AV       Row Name 07/14/23 1400          Plan of Care Review    Plan of Care Reviewed With patient  -AV     Progress no change  -AV     Outcome Evaluation Patient not appropriate for skilled PT services at this time as he has not had a signficant decline in functional mobility. Patient completing all transfers and ambulating independently. D/C PT order.  -AV       Row Name 07/14/23 1400          Positioning and Restraints    Pre-Treatment Position in bed  -AV     Post Treatment Position chair  -AV     In Chair sitting;call light within reach;encouraged to call for assist;exit alarm on   -AV       Row Name 07/14/23 1400          PT Evaluation Complexity    History, PT Evaluation Complexity no personal factors and/or comorbidities  -AV     Examination of Body Systems (PT Eval Complexity) total of 4 or more elements  -AV     Clinical Presentation (PT Evaluation Complexity) stable  -AV     Clinical Decision Making (PT Evaluation Complexity) low complexity  -AV     Overall Complexity (PT Evaluation Complexity) low complexity  -AV       Row Name 07/14/23 1400          Therapy Plan Review/Discharge Plan (PT)    Therapy Plan Review (PT) evaluation/treatment results reviewed;patient  -AV               User Key  (r) = Recorded By, (t) = Taken By, (c) = Cosigned By      Initials Name Provider Type    AV Omi Hill, PT Physical Therapist                    Physical Therapy Education       Title: PT OT SLP Therapies (In Progress)       Topic: Physical Therapy (In Progress)       Point: Mobility training (Done)       Learning Progress Summary             Patient Acceptance, E,TB, VU by AV at 7/14/2023 1446                         Point: Home exercise program (Not Started)       Learner Progress:  Not documented in this visit.              Point: Body mechanics (Done)       Learning Progress Summary             Patient Acceptance, E,TB, VU by AV at 7/14/2023 1446                         Point: Precautions (Done)       Learning Progress Summary             Patient Acceptance, E,TB, VU by AV at 7/14/2023 1446                                         User Key       Initials Effective Dates Name Provider Type Discipline    AV 06/11/21 -  Omi Hill, PT Physical Therapist PT                  PT Recommendation and Plan  Anticipated Discharge Disposition (PT): home  Plan of Care Reviewed With: patient  Progress: no change  Outcome Evaluation: Patient not appropriate for skilled PT services at this time as he has not had a signficant decline in functional mobility. Patient completing all transfers and  ambulating independently. D/C PT order.   Outcome Measures       Row Name 07/14/23 1400             How much help from another person do you currently need...    Turning from your back to your side while in flat bed without using bedrails? 4  -AV      Moving from lying on back to sitting on the side of a flat bed without bedrails? 4  -AV      Moving to and from a bed to a chair (including a wheelchair)? 4  -AV      Standing up from a chair using your arms (e.g., wheelchair, bedside chair)? 4  -AV      Climbing 3-5 steps with a railing? 4  -AV      To walk in hospital room? 4  -AV      AM-PAC 6 Clicks Score (PT) 24  -AV         Functional Assessment    Outcome Measure Options AM-PAC 6 Clicks Basic Mobility (PT)  -AV                User Key  (r) = Recorded By, (t) = Taken By, (c) = Cosigned By      Initials Name Provider Type    Omi Najera, PT Physical Therapist                     Time Calculation:    PT Charges       Row Name 07/14/23 1446             Time Calculation    PT Received On 07/14/23  -AV         Untimed Charges    PT Eval/Re-eval Minutes 35  -AV         Total Minutes    Untimed Charges Total Minutes 35  -AV       Total Minutes 35  -AV                User Key  (r) = Recorded By, (t) = Taken By, (c) = Cosigned By      Initials Name Provider Type    Omi Najera, ELLE Physical Therapist                  Therapy Charges for Today       Code Description Service Date Service Provider Modifiers Qty    62953481284 HC PT EVAL LOW COMPLEXITY 3 7/14/2023 Omi Hill, PT GP 1            PT G-Codes  Outcome Measure Options: AM-PAC 6 Clicks Basic Mobility (PT)  AM-PAC 6 Clicks Score (PT): 24    Omi Hill PT  7/14/2023

## 2023-07-14 NOTE — THERAPY EVALUATION
Patient Name: Emmett Pandya  : 1950    MRN: [id number]                              Today's Date: 2023       Admit Date: 2023    Visit Dx:     ICD-10-CM ICD-9-CM   1. Acute hyperkalemia  E87.5 276.7   2. Acute renal failure superimposed on chronic kidney disease, unspecified CKD stage, unspecified acute renal failure type  N17.9 584.9    N18.9 585.9   3. Nausea and vomiting, unspecified vomiting type  R11.2 787.01   4. Difficulty walking  R26.2 719.7   5. Decreased activities of daily living (ADL)  Z78.9 V49.89     Patient Active Problem List   Diagnosis    Chronic heart failure with preserved ejection fraction (HFpEF)    CAD S/P percutaneous coronary angioplasty    Hyperlipidemia LDL goal <70    Essential hypertension    Kidney stones    Gout    Stage 3 chronic kidney disease    Vitamin D deficiency    Hyperkalemia     Past Medical History:   Diagnosis Date    CHF (congestive heart failure)     Chronic heart failure with preserved ejection fraction (HFpEF) 12/3/2021    Chronic kidney disease     Hyperlipidemia     Hypertension      Past Surgical History:   Procedure Laterality Date    CARDIAC CATHETERIZATION      CORONARY STENT PLACEMENT        General Information       Row Name 23 1459          OT Time and Intention    Document Type evaluation  -LF     Mode of Treatment individual therapy;occupational therapy  -LF       Row Name 23 1459          General Information    Patient Profile Reviewed yes  -LF     Prior Level of Function --  (I) with ADLs, ambulated w/o a device, has a step over tub where he stands to shower, elevated commode, stands to groom, drives, and no home O2.  -LF     Existing Precautions/Restrictions no known precautions/restrictions  -LF     Barriers to Rehab none identified  -LF       Row Name 23 1459          Occupational Profile    Reason for Services/Referral (Occupational Profile) Patient is a 72 year old male admitted to [facility] for  shortness of breath on July 13th, 2023. Occupational therapy consulted due to recent decline in ADLs/functional transfers. No previous occupational therapy services for current condition.  -       Row Name 07/14/23 1459          Living Environment    People in Home significant other  -       Row Name 07/14/23 1459          Home Main Entrance    Number of Stairs, Main Entrance three  -       Row Name 07/14/23 1459          Cognition    Orientation Status (Cognition) oriented x 4  -       Row Name 07/14/23 1459          Safety Issues, Functional Mobility    Impairments Affecting Function (Mobility) other (see comments)  N/A  -               User Key  (r) = Recorded By, (t) = Taken By, (c) = Cosigned By      Initials Name Provider Type     Becki Monae OT Occupational Therapist                     Mobility/ADL's       [facility] Name 07/14/23 1502          Bed Mobility    Bed Mobility bed mobility (all) activities  -     All Activities, [place] (Bed Mobility) independent  -[facility] Name 07/14/23 1502          Transfers    Transfers sit-stand transfer;stand-sit transfer  -       Row Name 07/14/23 1502          Sit-Stand Transfer    Sit-Stand [place] (Transfers) independent  -[facility] Name 07/14/23 1502          Stand-Sit Transfer    Stand-Sit [place] (Transfers) independent  -[facility] Name 07/14/23 1502          Functional Mobility    Functional Mobility- Ind. Level independent  -[facility] Name 07/14/23 1502          Activities of Daily Living    BADL Assessment/Intervention bathing;upper body dressing;lower body dressing;grooming;feeding;toileting  -[facility] Name 07/14/23 1502          Bathing Assessment/Intervention    [place] Level (Bathing) bathing skills;upper body;independent;lower body;standby assist  -[facility] Name 07/14/23 1502          Upper Body Dressing Assessment/Training    [place] Level (Upper Body Dressing) upper body dressing  skills;independent  -       Row Name 07/14/23 1502          Lower Body Dressing Assessment/Training    [place] Level (Lower Body Dressing) lower body dressing skills;standby assist  -LF       Row Name 07/14/23 1502          Grooming Assessment/Training    [place] Level (Grooming) grooming skills;independent  -LF       Row Name 07/14/23 1502          Self-Feeding Assessment/Training    [place] Level (Feeding) feeding skills;independent  -LF       Row Name 07/14/23 1502          Toileting Assessment/Training    [place] Level (Toileting) toileting skills;independent  -               User Key  (r) = Recorded By, (t) = Taken By, (c) = Cosigned By      Initials Name Provider Type     Becki Monae OT Occupational Therapist                   Obj/Interventions       [facility] Name 07/14/23 1502          Sensory Assessment (Somatosensory)    Sensory Assessment (Somatosensory) UE sensation intact  -LF       Row Name 07/14/23 1502          Vision Assessment/Intervention    Visual Impairment/Limitations WFL  -LF       Row Name 07/14/23 1502          Range of Motion Comprehensive    General Range of Motion bilateral upper extremity ROM WFL  -LF       Row Name 07/14/23 1502          Strength Comprehensive (MMT)    Comment, General Manual Muscle Testing (MMT) Assessment 5/5 BUEs  -[facility] Name 07/14/23 1502          Motor Skills    Motor Skills coordination;functional endurance  -LF     Coordination WFL  -     Functional Endurance Good for BADLs  -LF       Row Name 07/14/23 1502          Balance    Balance Assessment sitting dynamic balance;standing dynamic balance  -LF     Dynamic Sitting Balance independent  -LF     Position, Sitting Balance unsupported;sitting edge of bed  -LF     Dynamic Standing Balance independent  -LF     Position/Device Used, Standing Balance unsupported  -LF               User Key  (r) = Recorded By, (t) = Taken By, (c) = Cosigned By      Initials Name Provider Type    LF  Becki Monae OT Occupational Therapist                   Goals/Plan    No documentation.                  Clinical Impression       Row Name 07/14/23 1503          Pain Assessment    Additional Documentation Pain Scale: FACES Pre/Post-Treatment (Group)  -       Row Name 07/14/23 1503          Pain Scale: FACES Pre/Post-Treatment    Pain: FACES Scale, Pretreatment 0-->no hurt  -LF     Posttreatment Pain Rating 0-->no hurt  -LF       Row Name 07/14/23 1501          Plan of Care Review    Plan of Care Reviewed With patient  -     Progress no change  -     Outcome Evaluation Patient does not present with any significant decline in function and does not require inpatient occupational therapy, therefore they will be discharged from services at this time. He performs all functional transfers/mobility independently without a device and all BADLs with SBA or better, stating that his significant other assists hime with LB self-care tasks at baseline. It is recommended he discharge home with assist when medically able to do so. Patient is aware and agreeable with treatment plan at this time.  -       Row Name 07/14/23 1507          Therapy Assessment/Plan (OT)    Patient/Family Therapy Goal Statement (OT) None reported.  -     Rehab Potential (OT) other (see comments)  N/A  -     Criteria for Skilled Therapeutic Interventions Met (OT) no problems identified which require skilled intervention  -     Therapy Frequency (OT) evaluation only  -       Row Name 07/14/23 1502          Therapy Plan Review/Discharge Plan (OT)    Anticipated Discharge Disposition (OT) home with assist  -       Row Name 07/14/23 1507          Vital Signs    O2 Delivery Pre Treatment room air  -LF     O2 Delivery Intra Treatment room air  -LF     O2 Delivery Post Treatment room air  -LF               User Key  (r) = Recorded By, (t) = Taken By, (c) = Cosigned By      Initials Name Provider Type    LF Becki Monae, NIKKY Occupational  Therapist                   Outcome Measures       Row Name 07/14/23 1504          How much help from another is currently needed...    Putting on and taking off regular lower body clothing? 3  -LF     Bathing (including washing, rinsing, and drying) 3  -LF     Toileting (which includes using toilet bed pan or urinal) 4  -LF     Putting on and taking off regular upper body clothing 4  -LF     Taking care of personal grooming (such as brushing teeth) 4  -LF     Eating meals 4  -LF     AM-PAC 6 Clicks Score (OT) 22  -LF       Row Name 07/14/23 1400 07/14/23 1100       How much help from another person do you currently need...    Turning from your back to your side while in flat bed without using bedrails? 4  -AV 4  -KP    Moving from lying on back to sitting on the side of a flat bed without bedrails? 4  -AV 4  -KP    Moving to and from a bed to a chair (including a wheelchair)? 4  -AV 4  -KP    Standing up from a chair using your arms (e.g., wheelchair, bedside chair)? 4  -AV 4  -KP    Climbing 3-5 steps with a railing? 4  -AV 4  -KP    To walk in hospital room? 4  -AV 4  -KP    AM-PAC 6 Clicks Score (PT) 24  -AV 24  -KP    Highest level of mobility 8 --> Walked 250 feet or more  -AV 8 --> Walked 250 feet or more  -KP      Row Name 07/14/23 1504 07/14/23 1400       Functional Assessment    Outcome Measure Options AM-PAC 6 Clicks Daily Activity (OT);Optimal Instrument  -LF AM-PAC 6 Clicks Basic Mobility (PT)  -AV      Row Name 07/14/23 1504          Optimal Instrument    Optimal Instrument Optimal - 3  -LF     Bending/Stooping 2  -LF     Standing 1  -LF     Reaching 1  -LF     From the list, choose the 3 activities you would most like to be able to do without any difficulty Bending/stooping;Standing;Reaching  -LF     Total Score Optimal - 3 4  -LF               User Key  (r) = Recorded By, (t) = Taken By, (c) = Cosigned By      Initials Name Provider Type    Anat Broussard, RN Registered Nurse    MAURILIO Monae  NIKKY Connell Occupational Therapist    Omi Najera, PT Physical Therapist                    Occupational Therapy Education       Title: PT OT SLP Therapies (In Progress)       Topic: Occupational Therapy (In Progress)       Point: ADL training (In Progress)       Description:   Instruct learner(s) on proper safety adaptation and remediation techniques during self care or transfers.   Instruct in proper use of assistive devices.                  Learning Progress Summary             Patient Acceptance, E,TB, NR by  at 7/14/2023 1505                         Point: Home exercise program (Not Started)       Description:   Instruct learner(s) on appropriate technique for monitoring, assisting and/or progressing therapeutic exercises/activities.                  Learner Progress:  Not documented in this visit.              Point: Precautions (In Progress)       Description:   Instruct learner(s) on prescribed precautions during self-care and functional transfers.                  Learning Progress Summary             Patient Acceptance, E,TB, NR by  at 7/14/2023 1505                         Point: Body mechanics (In Progress)       Description:   Instruct learner(s) on proper positioning and spine alignment during self-care, functional mobility activities and/or exercises.                  Learning Progress Summary             Patient Acceptance, E,TB, NR by  at 7/14/2023 1505                                         User Key       Initials Effective Dates Name Provider Type Discipline     06/16/21 -  Becki Monae OT Occupational Therapist OT                  OT Recommendation and Plan  Therapy Frequency (OT): evaluation only  Plan of Care Review  Plan of Care Reviewed With: patient  Progress: no change  Outcome Evaluation: Patient does not present with any significant decline in function and does not require inpatient occupational therapy, therefore they will be discharged from services at this time.  He performs all functional transfers/mobility independently without a device and all BADLs with SBA or better, stating that his significant other assists hime with LB self-care tasks at baseline. It is recommended he discharge home with assist when medically able to do so. Patient is aware and agreeable with treatment plan at this time.     Time Calculation:    Time Calculation- OT       Row Name 07/14/23 1505             Time Calculation- OT    OT Received On 07/14/23  -LF         Untimed Charges    OT Eval/Re-eval Minutes 34  -LF         Total Minutes    Untimed Charges Total Minutes 34  -LF       Total Minutes 34  -LF                User Key  (r) = Recorded By, (t) = Taken By, (c) = Cosigned By      Initials Name Provider Type    LF Becki Monae OT Occupational Therapist                  Therapy Charges for Today       Code Description Service Date Service Provider Modifiers Qty    13867520190 HC OT EVAL LOW COMPLEXITY 3 7/14/2023 Becki Monae OT GO 1                 Becki Monae OT  7/14/2023

## 2023-07-14 NOTE — H&P
[facility]    Patient Identification:  Name:  Emmett Pandya  Age:  72 y.o.  Sex:  male  :  1950  MRN:  [id number]   Visit Number:  [id number]  Admit Date: 2023   Room number:  43/43  Primary Care Physician:  Roscoe Harrison Jr., MD    Date of Admission: 2023     Subjective     Chief complaint:    Chief Complaint   Patient presents with    Shortness of Breath       History of presenting illness:    This is a 72-year-old male with a past medical history of CKD stage III, hyperlipidemia, hypertension and diabetes mellitus type 2 presenting with weakness.  Patient states that roughly 3 weeks ago he was started on metformin.  Since then he has been experiencing diarrhea, weakness, confusion and dizziness.  In ED, blood pressure 108/52, heart rate 65, respiratory of 18, temperature 97.4 and O2 saturation 96% on room air.  Labs on arrival showed a sodium 134, potassium 0.2, BUN 41, creatinine 5.8, glucose 136, WC 10.2, hemoglobin 10.3 and platelet count 194.      ---------------------------------------------------------------------------------------------------------------------   Review of Systems   Constitutional:  Positive for activity change, appetite change and fatigue.   HENT:  Negative for sinus pain and sore throat.    Eyes:  Negative for pain.   Respiratory:  Negative for chest tightness and shortness of breath.    Cardiovascular:  Negative for leg swelling.   Gastrointestinal:  Positive for diarrhea, nausea and vomiting. Negative for anal bleeding.   Endocrine: Negative for polydipsia.   Genitourinary:  Negative for enuresis and penile swelling.   Musculoskeletal:  Negative for joint swelling.   Neurological:  Positive for dizziness. Negative for numbness and headaches.   Psychiatric/Behavioral:  Negative for behavioral problems.    ---------------------------------------------------------------------------------------------------------------------    Past Medical History:   Diagnosis Date    CHF (congestive heart failure)     Chronic heart failure with preserved ejection fraction (HFpEF) 12/3/2021    Chronic kidney disease     Hyperlipidemia     Hypertension      Past Surgical History:   Procedure Laterality Date    CARDIAC CATHETERIZATION      CORONARY STENT PLACEMENT       Family History   Problem Relation Age of Onset    No Known Problems Mother     No Known Problems Father      Social History     Socioeconomic History    Marital status: Single   Tobacco Use    Smoking status: Former     Packs/day: 2.00     Types: Cigarettes     Start date:      Quit date: 2006     Years since quittin.6    Smokeless tobacco: Never   Vaping Use    Vaping Use: Never used   Substance and Sexual Activity    Alcohol use: Never    Drug use: Never    Sexual activity: Defer     ---------------------------------------------------------------------------------------------------------------------   Allergies:  Patient has no known allergies.  ---------------------------------------------------------------------------------------------------------------------   Medications below are reported home medications pulling from within the system; at this time, these medications have not been reconciled unless otherwise specified and are in the verification process for further verifcation as current home medications.      Prior to Admission Medications       Prescriptions Last Dose Informant Patient Reported? Taking?    lisinopril (PRINIVIL,ZESTRIL) 30 MG tablet   Yes Yes    Take 1 tablet by mouth Daily.    metFORMIN (GLUCOPHAGE) 500 MG tablet   Yes Yes    Take 1 tablet by mouth 2 (Two) Times a Day With Meals.    olmesartan (BENICAR) 40 MG tablet   Yes Yes    Take 1 tablet by mouth Daily.    spironolactone (ALDACTONE) 50 MG tablet   Yes Yes    Take 1 tablet by mouth Daily.    allopurinol (ZYLOPRIM) 100 MG tablet   Yes No    Take 100 mg by mouth Daily.    aspirin 81 MG EC tablet   Self Yes No    Take 81 mg by mouth Daily.    atorvastatin (LIPITOR) 40 MG tablet   No No    TAKE 1 TABLET BY MOUTH EVERY DAY    carvedilol (COREG) 12.5 MG tablet   No No    TAKE 1 TABLET BY MOUTH TWICE A DAY    furosemide (LASIX) 80 MG tablet   Yes No    Take 80 mg by mouth. 2 tabs po q am and 1 po qpm    vitamin D (ERGOCALCIFEROL) 1.25 MG (10059 UT) capsule capsule   Yes No    Every 7 (Seven) Days.          Objective     Vital Signs:  Temp:  [97.4 °F (36.3 °C)] 97.4 °F (36.3 °C)  Heart Rate:  [59-70] 68  Resp:  [18] 18  BP: (100-108)/(43-52) 100/43    Mean Arterial Pressure (Non-Invasive) for the past 24 hrs (Last 3 readings):   Noninvasive MAP (mmHg)   07/13/23 1730 59     SpO2:  [92 %-98 %] 98 %  on   ;   Device (Oxygen Therapy): room air  Body mass index is 38.66 kg/m².    Wt Readings from Last 3 Encounters:   07/13/23 129 kg (285 lb 0.9 oz)   02/15/23 (!) 138 kg (304 lb)   06/22/22 (!) 144 kg (317 lb)      ----------------------------------------------------------------------------------------------------------------------  PHYSICAL EXAMINATION:  GENERAL: The patient is well developed and nontoxic.  HEENT: Nonicteric sclerae, PERRLA, EOMI. Oropharynx clear. Moist mucous membranes. Conjunctivae appear well perfused.  CHEST: Chest wall is nontender.  HEART: Regular rate and rhythm without murmurs.  LUNGS: Clear to auscultation bilaterally.  ABDOMEN: Soft, positive bowel sounds, nontender, no organomegaly.  RECTAL: Deferred.  SKIN: No rash, no excessive bruising, petechiae, or purpura.  NEUROLOGIC: Cranial nerves II-XII intact without motor/sensory deficit.    ---------------------------------------------------------------------------------------------------------------------  --------------------------------------------------------------------------------------------------------------------  LABS:    CBC and coagulation:  Results from last 7 days   Lab Units 07/13/23  1615   WBC 10*3/mm3 10.24   HEMOGLOBIN g/dL  10.3*   HEMATOCRIT % 33.3*   MCV fL 99.1*   MCHC g/dL 30.9*   PLATELETS 10*3/mm3 194     Acid/base balance:      Renal and electrolytes:  Results from last 7 days   Lab Units 07/13/23  1733   SODIUM mmol/L 134*   POTASSIUM mmol/L 7.2*   CHLORIDE mmol/L 101   CO2 mmol/L 17.2*   BUN mg/dL 141*   CREATININE mg/dL 5.82*   CALCIUM mg/dL 10.6*   GLUCOSE mg/dL 136*     Estimated Creatinine Clearance: 16.4 mL/min (A) (by C-G formula based on SCr of 5.82 mg/dL (H)).    Liver and pancreatic function:  Results from last 7 days   Lab Units 07/13/23  1733   ALBUMIN g/dL 4.2   BILIRUBIN mg/dL 1.0   ALK PHOS U/L 102   AST (SGOT) U/L 11   ALT (SGPT) U/L 13     Endocrine function:  No results found for: HGBA1C  Point of care bedside glucose levels:  Results from last 7 days   Lab Units 07/13/23  1617   GLUCOSE mg/dL 146*     Lab Results   Component Value Date    TSH 3.000 08/09/2019    FREET4 1.0 08/09/2019     Cardiac:  Results from last 7 days   Lab Units 07/13/23  1733 07/13/23  1615   HSTROP T ng/L 46*  --    PROBNP pg/mL  --  195.9       Cultures:  Lab Results   Component Value Date    CLARITYU CLEAR 08/07/2019    GLUCOSEU NEGATIVE 08/07/2019    KETONESU NEGATIVE 08/07/2019    BLOODU NEGATIVE 08/07/2019    NITRITEU NEGATIVE 08/07/2019    LEUKOCYTESUR NEGATIVE 08/07/2019    UROBILINOGEN 0.2 08/07/2019     Microbiology Results (last 10 days)       ** No results found for the last 240 hours. **            No results found for: PREGTESTUR, PREGSERUM, HCG, HCGQUANT  Pain Management Panel           No data to display                I have personally looked at the labs and they are summarized above.  ----------------------------------------------------------------------------------------------------------------------  Detailed radiology reports for the last 24 hours:    Imaging Results (Last 24 Hours)       Procedure Component Value Units Date/Time    XR Chest 1 View [[id number]] Collected: 07/13/23 1656     Updated: 07/13/23 9212     Narrative:      PROCEDURE: XR CHEST 1 VW     COMPARISON: [facility], CR, CHEST AP/PA 1 VIEW, 8/07/2019, 10:58.     INDICATIONS: SHORTNESS OF BREATH     FINDINGS:   Lungs are clear bilaterally. Cardiac and mediastinal contours within normal limits. Regional   skeleton within normal limits for age.          Impression:         1. No acute cardiopulmonary disease.                  GAYATHRI RONDON MD         Electronically Signed and Approved By: GAYATHRI ORNDON MD on 7/13/2023 at 16:55                           Final impressions for the last 30 days of radiology reports:    XR Chest 1 View    Result Date: 7/13/2023    1. No acute cardiopulmonary disease.       GAYATHRI RONDON MD       Electronically Signed and Approved By: GAYATHRI RONDON MD on 7/13/2023 at 16:55                Assessment & Plan     This is a 72-year-old male with a past medical history of CKD stage III, hyperlipidemia, hypertension and diabetes mellitus type 2 presenting with weakness.      Assessment:  DWAIN on CKD  Hyperkalemia  Anemia  Weakness  Hyperlipidemia   Hypertension  Diabetes mellitus  Nausea vomiting  Diarrhea    Plan:  - Admit to icu  - TELE  - EKG  - Hyperkalemia protocol  - Per nephrology, no need for urgent dialysis at this time, we will treat medically  - IVF  - Hold metformin  - Patient blood glucose goal of 140-180  - Start insulin sliding scale  - Carbohydrate controlled diet  - Monitor Potassium  - ABG  - Daily weights  - Strict I/O   - OT/PT  - Zofran prn  - Consult nephrology  - Consult icu      Dion Kenney MD  [facility]  07/13/23  20:02 EDT

## 2023-07-14 NOTE — CONSULTS
[facility]   Nephrology Consult Note      Patient Name: Emmett Pandya  : 1950  MRN: [id number]  Primary Care Physician:  Roscoe Harrison Jr., MD  Referring Physician: No ref. provider found  Date of admission: 2023    Subjective   Subjective     Reason for Consult/ Chief Complaint: Acute kidney injury with life-threatening hyperkalemia.    HPI:  Emmett Pandya is a 72 y.o. male with history of diabetes, hypertension and CKD stage III who presented to the ER with generalized weakness and was found to have a potassium of 7.7 and creatinine of 5.8.  Patient is known to me from the office last time I saw him in February.  He was doing well his potassium was low and spironolactone then was increased.  Recently he was evaluated by PCP and metformin was added.  He developed nausea and persistent diarrhea.  Also lisinopril was added.  He was already on spironolactone, olmesartan and Lasix.  Patient became hypotensive and has been dizzy especially the day prior to admission.  He is about 15 pounds below his usual weight.  Patient treated acutely for hyperkalemia and was admitted to ICU.  Last night I called and started him on bicarb drip.  This morning, his potassium is down to 5.4, he is feeling a lot better.  He is voiding without problem, no gross hematuria.  Office records reviewed.    Review of Systems   All systems were reviewed and negative except for: What is mentioned above.    Personal History     Past Medical History:   Diagnosis Date    CHF (congestive heart failure)     Chronic heart failure with preserved ejection fraction (HFpEF) 12/3/2021    Chronic kidney disease     Hyperlipidemia     Hypertension    Diabetes  Chronic lower extremity edema  Chronic kidney disease stage IIIb.    Past Surgical History:   Procedure Laterality Date    CARDIAC CATHETERIZATION      CORONARY STENT PLACEMENT         Family History: family history includes No Known Problems in his father and mother. Otherwise  pertinent FHx was reviewed and not pertinent to current issue.    Social History:  reports that he quit smoking about 16 years ago. His smoking use included cigarettes. He started smoking about 56 years ago. He smoked an average of 2 packs per day. He has never used smokeless tobacco. He reports that he does not drink alcohol and does not use drugs.    Home Medications:  allopurinol, aspirin, atorvastatin, carvedilol, furosemide, lisinopril, metFORMIN, olmesartan, and spironolactone    Allergies:  No Known Allergies    Objective    Objective     Vitals:   Temp:  [97.2 °F (36.2 °C)-97.6 °F (36.4 °C)] 97.4 °F (36.3 °C)  Heart Rate:  [59-86] 65  Resp:  [15-18] 16  BP: ()/() 88/59     Physical Exam    Constitutional: Awake, alert, no distress, conversant and pleasant, sitting at bedside.   Eyes: sclerae anicteric, no conjunctival injection   HENT: mucous membranes moist   Neck: Supple, no thyromegaly, no lymphadenopathy, trachea midline, No JVD   Respiratory: Clear to auscultation bilaterally, nonlabored respirations    Cardiovascular: RRR, no murmurs, rubs, or gallops.   Gastrointestinal: Positive bowel sounds, soft, nontender, nondistended   Musculoskeletal: Trace edema, legs are wrapped, less edema than what I remember.     Psychiatric: Appropriate affect, cooperative   Neurologic: Oriented x 3, moving all extremities, Cranial Nerves grossly intact, speech clear   Skin: warm and dry, no rashes     Result Review    Result Reviewed:  I have personally reviewed the results from the time of this admission to 7/14/2023 16:09 EDT and agree with these findings:  [x]  Laboratory  []  Microbiology  [x]  Radiology  []  EKG/Telemetry   []  Cardiology/Vascular   []  Pathology  [x]  Old records  []  Other:  Lab Results   Component Value Date    GLUCOSE 121 (H) 07/14/2023    CALCIUM 10.6 (H) 07/14/2023     07/14/2023    K 4.9 07/14/2023    CO2 19.4 (L) 07/14/2023     07/14/2023     (H) 07/14/2023     CREATININE 5.09 (H) 07/14/2023    BCR 26.1 (H) 07/14/2023    ANIONGAP 17.6 (H) 07/14/2023      Lab Results   Component Value Date    CALCIUM 10.6 (H) 07/14/2023    PHOS 3.9 08/10/2019              Most notable findings include: Potassium 7.2 on admission, creatinine 5.8, calcium 10.6.    Assessment & Plan   Assessment / Plan     Brief Patient Summary:  Emmett Pandya is a 72 y.o. male who is admitted with acute kidney injury and hyperkalemia.    Active Hospital Problems:  Active Hospital Problems    Diagnosis     **Hyperkalemia        Assessment and Plan:   - Acute kidney injury most likely secondary to hypotension and volume depletion.  This is worsened by large doses of diuretics and diarrhea.  Currently receiving IV fluids in the form of bicarb drip.  Will decrease rate this afternoon.  Check bladder scan x1 . monitor renal function closely.  - Life-threatening hyperkalemia secondary to combination of CKD, metabolic acidosis, olmesartan, lisinopril and spironolactone.  Treated acutely, potassium improved.  Monitor.  These medications were held.  Before I enter this note, last potassium was 4.9.  - Chronic kidney disease stage IIIb, baseline creatinine around 1.8, multifactorial.  - Congestive heart failure with coronary artery disease, sees cardiology as outpatient.  - Diabetes with early complications.  - Metabolic acidosis likely secondary to CKD and diarrhea possibly from metformin.  This was discontinued.  - Hypercalcemia, possibly secondary to volume contraction.  Reevaluate in a.m.  Discussed with ICU staff.  We will follow.    Thank you very much for this consult!    Electronically signed by Marzena Morrell MD, 07/14/23, 4:09 PM EDT.

## 2023-07-14 NOTE — PLAN OF CARE
Goal Outcome Evaluation:  Plan of Care Reviewed With: patient        Progress: no change  Outcome Evaluation: Patient not appropriate for skilled PT services at this time as he has not had a signficant decline in functional mobility. Patient completing all transfers and ambulating independently. D/C PT order.      Anticipated Discharge Disposition (PT): home

## 2023-07-15 LAB
ALBUMIN SERPL-MCNC: 3.8 G/DL (ref 3.5–5.2)
ALBUMIN/GLOB SERPL: 1 G/DL
ALP SERPL-CCNC: 97 U/L (ref 39–117)
ALT SERPL W P-5'-P-CCNC: 14 U/L (ref 1–41)
ANION GAP SERPL CALCULATED.3IONS-SCNC: 15.2 MMOL/L (ref 5–15)
AST SERPL-CCNC: 14 U/L (ref 1–40)
BASOPHILS # BLD AUTO: 0.04 10*3/MM3 (ref 0–0.2)
BASOPHILS NFR BLD AUTO: 0.4 % (ref 0–1.5)
BILIRUB SERPL-MCNC: 1 MG/DL (ref 0–1.2)
BUN SERPL-MCNC: 127 MG/DL (ref 8–23)
BUN/CREAT SERPL: 26.2 (ref 7–25)
CALCIUM SPEC-SCNC: 9.7 MG/DL (ref 8.6–10.5)
CHLORIDE SERPL-SCNC: 94 MMOL/L (ref 98–107)
CO2 SERPL-SCNC: 28.8 MMOL/L (ref 22–29)
CREAT SERPL-MCNC: 4.84 MG/DL (ref 0.76–1.27)
DEPRECATED RDW RBC AUTO: 57.7 FL (ref 37–54)
EGFRCR SERPLBLD CKD-EPI 2021: 12.2 ML/MIN/1.73
EOSINOPHIL # BLD AUTO: 0.37 10*3/MM3 (ref 0–0.4)
EOSINOPHIL NFR BLD AUTO: 3.4 % (ref 0.3–6.2)
ERYTHROCYTE [DISTWIDTH] IN BLOOD BY AUTOMATED COUNT: 16.8 % (ref 12.3–15.4)
GLOBULIN UR ELPH-MCNC: 3.8 GM/DL
GLUCOSE BLDC GLUCOMTR-MCNC: 124 MG/DL (ref 70–99)
GLUCOSE BLDC GLUCOMTR-MCNC: 130 MG/DL (ref 70–99)
GLUCOSE BLDC GLUCOMTR-MCNC: 152 MG/DL (ref 70–99)
GLUCOSE BLDC GLUCOMTR-MCNC: 175 MG/DL (ref 70–99)
GLUCOSE SERPL-MCNC: 120 MG/DL (ref 65–99)
HCT VFR BLD AUTO: 31.1 % (ref 37.5–51)
HGB BLD-MCNC: 9.9 G/DL (ref 13–17.7)
IMM GRANULOCYTES # BLD AUTO: 0.04 10*3/MM3 (ref 0–0.05)
IMM GRANULOCYTES NFR BLD AUTO: 0.4 % (ref 0–0.5)
LYMPHOCYTES # BLD AUTO: 1.6 10*3/MM3 (ref 0.7–3.1)
LYMPHOCYTES NFR BLD AUTO: 14.7 % (ref 19.6–45.3)
MAGNESIUM SERPL-MCNC: 2.1 MG/DL (ref 1.6–2.4)
MCH RBC QN AUTO: 30.1 PG (ref 26.6–33)
MCHC RBC AUTO-ENTMCNC: 31.8 G/DL (ref 31.5–35.7)
MCV RBC AUTO: 94.5 FL (ref 79–97)
MONOCYTES # BLD AUTO: 0.99 10*3/MM3 (ref 0.1–0.9)
MONOCYTES NFR BLD AUTO: 9.1 % (ref 5–12)
NEUTROPHILS NFR BLD AUTO: 7.87 10*3/MM3 (ref 1.7–7)
NEUTROPHILS NFR BLD AUTO: 72 % (ref 42.7–76)
NRBC BLD AUTO-RTO: 0 /100 WBC (ref 0–0.2)
PHOSPHATE SERPL-MCNC: 6.1 MG/DL (ref 2.5–4.5)
PLATELET # BLD AUTO: 168 10*3/MM3 (ref 140–450)
PMV BLD AUTO: 12.4 FL (ref 6–12)
POTASSIUM SERPL-SCNC: 4.3 MMOL/L (ref 3.5–5.2)
POTASSIUM SERPL-SCNC: 4.3 MMOL/L (ref 3.5–5.2)
POTASSIUM SERPL-SCNC: 4.5 MMOL/L (ref 3.5–5.2)
POTASSIUM SERPL-SCNC: 5 MMOL/L (ref 3.5–5.2)
PROT SERPL-MCNC: 7.6 G/DL (ref 6–8.5)
RBC # BLD AUTO: 3.29 10*6/MM3 (ref 4.14–5.8)
SODIUM SERPL-SCNC: 138 MMOL/L (ref 136–145)
WBC NRBC COR # BLD: 10.91 10*3/MM3 (ref 3.4–10.8)

## 2023-07-15 PROCEDURE — 63710000001 INSULIN LISPRO (HUMAN) PER 5 UNITS: Performed by: STUDENT IN AN ORGANIZED HEALTH CARE EDUCATION/TRAINING PROGRAM

## 2023-07-15 PROCEDURE — 85025 COMPLETE CBC W/AUTO DIFF WBC: CPT | Performed by: STUDENT IN AN ORGANIZED HEALTH CARE EDUCATION/TRAINING PROGRAM

## 2023-07-15 PROCEDURE — 83735 ASSAY OF MAGNESIUM: CPT | Performed by: PHYSICIAN ASSISTANT

## 2023-07-15 PROCEDURE — 99233 SBSQ HOSP IP/OBS HIGH 50: CPT | Performed by: INTERNAL MEDICINE

## 2023-07-15 PROCEDURE — 82948 REAGENT STRIP/BLOOD GLUCOSE: CPT

## 2023-07-15 PROCEDURE — 84132 ASSAY OF SERUM POTASSIUM: CPT | Performed by: STUDENT IN AN ORGANIZED HEALTH CARE EDUCATION/TRAINING PROGRAM

## 2023-07-15 PROCEDURE — 25010000002 HEPARIN (PORCINE) PER 1000 UNITS: Performed by: STUDENT IN AN ORGANIZED HEALTH CARE EDUCATION/TRAINING PROGRAM

## 2023-07-15 PROCEDURE — 80053 COMPREHEN METABOLIC PANEL: CPT | Performed by: STUDENT IN AN ORGANIZED HEALTH CARE EDUCATION/TRAINING PROGRAM

## 2023-07-15 PROCEDURE — 99232 SBSQ HOSP IP/OBS MODERATE 35: CPT | Performed by: INTERNAL MEDICINE

## 2023-07-15 PROCEDURE — 84100 ASSAY OF PHOSPHORUS: CPT | Performed by: PHYSICIAN ASSISTANT

## 2023-07-15 RX ORDER — MIDODRINE HYDROCHLORIDE 5 MG/1
5 TABLET ORAL EVERY 8 HOURS
Status: DISCONTINUED | OUTPATIENT
Start: 2023-07-15 | End: 2023-07-15

## 2023-07-15 RX ORDER — SODIUM CHLORIDE 9 MG/ML
125 INJECTION, SOLUTION INTRAVENOUS CONTINUOUS
Status: DISCONTINUED | OUTPATIENT
Start: 2023-07-15 | End: 2023-07-16

## 2023-07-15 RX ADMIN — Medication 10 ML: at 09:28

## 2023-07-15 RX ADMIN — SODIUM CHLORIDE 125 ML/HR: 9 INJECTION, SOLUTION INTRAVENOUS at 16:00

## 2023-07-15 RX ADMIN — HEPARIN SODIUM 5000 UNITS: 5000 INJECTION INTRAVENOUS; SUBCUTANEOUS at 21:33

## 2023-07-15 RX ADMIN — INSULIN LISPRO 2 UNITS: 100 INJECTION, SOLUTION INTRAVENOUS; SUBCUTANEOUS at 21:33

## 2023-07-15 RX ADMIN — ASPIRIN 81 MG: 81 TABLET, COATED ORAL at 09:28

## 2023-07-15 RX ADMIN — Medication 10 ML: at 21:33

## 2023-07-15 RX ADMIN — INSULIN LISPRO 2 UNITS: 100 INJECTION, SOLUTION INTRAVENOUS; SUBCUTANEOUS at 12:16

## 2023-07-15 RX ADMIN — ATORVASTATIN CALCIUM 40 MG: 40 TABLET, FILM COATED ORAL at 09:28

## 2023-07-15 RX ADMIN — HEPARIN SODIUM 5000 UNITS: 5000 INJECTION INTRAVENOUS; SUBCUTANEOUS at 09:28

## 2023-07-15 RX ADMIN — SODIUM CHLORIDE 125 ML/HR: 9 INJECTION, SOLUTION INTRAVENOUS at 23:57

## 2023-07-15 RX ADMIN — DOCUSATE SODIUM 50MG AND SENNOSIDES 8.6MG 2 TABLET: 8.6; 5 TABLET, FILM COATED ORAL at 09:28

## 2023-07-15 NOTE — PROGRESS NOTES
[facility]     Progress Note    Patient Name: Emmett Pandya  : 1950  MRN: [id number]  Primary Care Physician:  Roscoe Harrison Jr., MD  Date of admission: 2023    Subjective   Subjective   Chief Complaint:   Patient is critically ill in ICU with critical care management      Over last 24 hours,   Lisinopril and Metformin on hold  Treated with hyperkalemic protocol  On bicarb gtt  With soft BPs with systolics in the 80s    Overnight, no acute events.     This morning,  Sitting up in chair, conversing with staff  BP has improved, systolics 100+  Feels better, no longer confused or with dizziness  No more diarrhea  K is 4.5 today and CR 4.84      Review of Systems   General: Weakness; otherwise denied complaints  HEENT: Denied complaints  Respiratory: Denied complaints  Cardiovascular: Denied complaints  GI: Diarrhea-improving; otherwise denied complaints  : Denied complaints  MSK: Denied complaints  Endocrine: Denied complaints  Hematologic: Denied complaints  Psychiatric: Denied complaints  Neurologic: Dizziness and confusion-resolved; otherwise denied complaints  Skin: Denied complaints      Objective   Objective     Vitals:   Temp:  [97.2 °F (36.2 °C)-97.9 °F (36.6 °C)] 97.2 °F (36.2 °C)  Heart Rate:  [64-75] 75  Resp:  [14-17] 16  BP: ()/(47-97) 92/51  Physical Exam   Vital Signs Reviewed  General: WDWN, Alert, NAD.    HEENT:  PERRL, EOMI.  OP and nares clear, MMM  Neck:  Supple, no JVD, no thyromegaly  Chest:  good aeration, clear to auscultation bilaterally, no increased work of breathing, normal symmetric chest wall rise bilaterally  CV: RRR, no MGR, pulses 2+, equal.  Abd: Obese, soft, NT, ND, + BS  Extremities:  no clubbing, no cyanosis, bilateral lower extremity edema, no joint tenderness  Neuro:  A&Ox3, CN grossly intact, no focal deficits.  Skin: No rashes or lesions noted         Result Review    Result Review:  I have personally reviewed the results from the time of this  admission to 7/15/2023 11:54 EDT and agree with these findings:  []  Laboratory  []  Microbiology  []  Radiology  []  EKG/Telemetry   []  Cardiology/Vascular   []  Pathology  []  Old records  []  Other:  Most notable findings include:       Lab 07/15/23  0748 07/15/23  0433 07/14/23  2359 07/14/23  1531 07/14/23  1124 07/14/23  0746 07/14/23  0357 07/13/23  2205 07/13/23  1733 07/13/23  1733 07/13/23  1615   WBC  --  10.91*  --   --   --   --  12.18* 8.30  --   --  10.24   HEMOGLOBIN  --  9.9*  --   --   --   --  10.6* 10.1*  --   --  10.3*   HEMATOCRIT  --  31.1*  --   --   --   --  32.8* 34.1*  --   --  33.3*   PLATELETS  --  168  --   --   --   --  188 156  --   --  194   SODIUM  --  138  --   --   --   --  138 136  --  134*  --    POTASSIUM 4.3 4.5 5.0 5.6* 4.9 5.4* 5.4* 5.7*   < > 7.2*  --    CHLORIDE  --  94*  --   --   --   --  101 103  --  101  --    CO2  --  28.8  --   --   --   --  19.4* 14.5*  --  17.2*  --    BUN  --  127*  --   --   --   --  133* 136*  --  141*  --    CREATININE  --  4.84*  --   --   --   --  5.09* 5.48*  --  5.82*  --    GLUCOSE  --  120*  --   --   --   --  121* 122*  --  136*  --    CALCIUM  --  9.7  --   --   --   --  10.6* 10.8*  --  10.6*  --    PHOSPHORUS  --  6.1*  --   --   --   --   --   --   --   --   --    TOTAL PROTEIN  --  7.6  --   --   --   --  8.2 8.0  --  8.2  --    ALBUMIN  --  3.8  --   --   --   --  4.1 3.9  --  4.2  --    GLOBULIN  --  3.8  --   --   --   --  4.1 4.1  --  4.0  --     < > = values in this interval not displayed.         Assessment & Plan   Assessment / Plan       Active Hospital Problems:  Active Hospital Problems    Diagnosis     **Hyperkalemia      Impression:    Life-threatening hyperkalemia at 7.2, now 4.5  DWAIN on CKD  Anemia  Weakness/dizziness likely secondary to above  Nausea and vomiting likely secondary to above  Diabetes mellitus type 2  HTN  HLD        Plan:    Nephrology on board.  Monitoring labs.  Treating medically currently.  No  indication for emergent dialysis.  Continues on bicarb drip at 100 mL/hour  K is 4.5.  CR has improved and is 4.84  Trend renal function and electrolytes.  Replace as needed  Hold metformin and lisinopril  Continue home dose ASA, statin therapy  On diabetic diet  On SSI to optimize glucose control  Bowel regimen in place  Up in chair daily    We will sign off patient after transferred out of ICU      DVT prophylaxis:  Medical DVT prophylaxis orders are present.    CODE STATUS:   Code Status (Patient has no pulse and is not breathing): CPR (Attempt to Resuscitate)  Medical Interventions (Patient has pulse or is breathing): Full Support         INeelima PA sElectronically signed by DUSTIN Leach, 07/15/23, 12:02 PM EDT.       This visit was performed by BOTH a physician and an APC I have spent greater then 51% of the time caring for this patient. I personally evaluated and examined the patient. I performed all aspects of MDM as documented. , I have reviewed and confirmed the accuracy of the patient's history as documented in this note. and I have reexamined the patient and the results are consistent with the previously documented exam. I have updated the documentation as necessary.       Electronically signed by Jonathan Downing DO, 07/15/23, 5:07 PM EDT.

## 2023-07-15 NOTE — PLAN OF CARE
Goal Outcome Evaluation:  Plan of Care Reviewed With: patient        Progress: improving  Outcome Evaluation: Patient was a transfer from CCU this shift. Patient is alert and oriented x4. No complaints of pain throughout the shift. Blood glucose monitored. Continuous fluids maintained as ordered. Patient has BLE wrapped with kerlix and compression wrapping from home. Patient does not want to take dressing off for RN to assess. No new issues at this time.

## 2023-07-15 NOTE — PROGRESS NOTES
[facility]   Hospitalist Progress Note  Date: 7/15/2023  Patient Name: Emmett Pandya  : 1950  MRN: [id number]  Date of admission: 2023      Subjective   Subjective     Chief Complaint:   Diarrhea, weakness    Summary:   Emmett Pandya is a 72-year-old male with a past medical history of CKD stage III, hyperlipidemia, hypertension and diabetes mellitus type 2 presenting with weakness.  Patient states roughly 3 weeks ago he was started on metformin.  Since then he has been experiencing diarrhea, weakness, confusion and dizziness.  In ED, blood pressure 108/52, heart rate 65, respiratory of 18, temperature 97.4 and O2 saturation 96% on room air.  Labs on arrival showed a sodium 134, potassium 0.2, BUN 41, creatinine 5.8, glucose 136, WC 10.2, hemoglobin 10.3 and platelet count 194.  He was admitted to the ICU given hyperkalemia and acute renal failure     Interval Followup:   Sitting in bedside chair, feeling very well today.  No further reports of diarrhea.  Patient's hyperkalemia has improved, renal function slowly improving.  Patient continues to make urine.    Review of Systems   All systems were reviewed and negative except for: Denies any acute symptoms currently    Objective   Objective     Vitals:   Temp:  [97.1 °F (36.2 °C)-97.9 °F (36.6 °C)] 97.1 °F (36.2 °C)  Heart Rate:  [64-75] 67  Resp:  [14-17] 16  BP: ()/(43-97) 90/43  Physical Exam    Constitutional: Awake, alert, no acute distress   Eyes: Pupils equal, sclerae anicteric, no conjunctival injection   HENT: NCAT, mucous membranes moist   Neck: Supple, no thyromegaly, no lymphadenopathy, trachea midline   Respiratory: Clear to auscultation bilaterally, nonlabored respirations    Cardiovascular: RRR, no murmurs, rubs, or gallops, palpable pedal pulses bilaterally   Gastrointestinal: Positive bowel sounds, soft, nontender, nondistended   Musculoskeletal: Trace bilateral ankle edema, no clubbing or cyanosis to  extremities   Psychiatric: Appropriate affect, cooperative   Neurologic: Oriented x 3, strength symmetric in all extremities, Cranial Nerves grossly intact to confrontation, speech clear   Skin: No rashes     Result Review    Result Review:  I have personally reviewed the results from 7/15/2023 and agree with these findings:  []  Laboratory  []  Microbiology  []  Radiology  []  EKG/Telemetry   []  Cardiology/Vascular   []  Pathology  []  Old records  []  Other:    Assessment & Plan   Assessment / Plan     Assessment/Plan:  Life-threatening hyperkalemia  DWAIN on CKD stage IIIb  Creatinine 1.8  Metabolic acidosis  Hypercalcemia  Nausea vomiting  Congestive heart failure  CAD  Diabetes    Plan:  Patient stable to transfer out of the ICU  Nephrology, pulmonary critical care following  Patient remains on a bicarb drip per nephrology  Hyperkalemia resolved, decreasing frequency of lab checks  Continuing to monitor renal function  Anion gap still present, likely secondary to uremia  Patient's bicarb has significantly improved  Sliding scale insulin with hypoglycemia protocol  Holding home metformin and lisinopril  History of coronary artery disease, continue aspirin and statin  Patient medically stable to transfer out of the ICU, continuing to monitor in the hospital     Discussed plan with RN, intensivist    DVT prophylaxis:  Medical DVT prophylaxis orders are present.    CODE STATUS:   Code Status (Patient has no pulse and is not breathing): CPR (Attempt to Resuscitate)  Medical Interventions (Patient has pulse or is breathing): Full Support

## 2023-07-15 NOTE — PROGRESS NOTES
"Nephrology progress note    Jonathan Bolanos MD     Current history: Patient is alert currently he denies any chest pain shortness of breath he denies any nausea or vomiting currently    Current medication list:  aspirin, 81 mg, Oral, Daily  atorvastatin, 40 mg, Oral, Daily  heparin (porcine), 5,000 Units, Subcutaneous, Q12H  insulin lispro, 2-7 Units, Subcutaneous, 4x Daily AC & at Bedtime  midodrine, 5 mg, Oral, Q8H  senna-docusate sodium, 2 tablet, Oral, BID  sodium chloride, 10 mL, Intravenous, Q12H         senna-docusate sodium **AND** polyethylene glycol **AND** bisacodyl **AND** bisacodyl    dextrose    dextrose    glucagon (human recombinant)    nitroglycerin    ondansetron    sodium chloride    sodium chloride    sodium chloride     Physical Exam:  BP 90/43 (BP Location: Right arm, Patient Position: Lying)   Pulse 67   Temp 97.1 °F (36.2 °C) (Oral)   Resp 16   Ht 182.9 cm (72\")   Wt 129 kg (285 lb 0.9 oz)   SpO2 90%   BMI 38.66 kg/m²     Intake/Output Summary (Last 24 hours) at 7/15/2023 1520  Last data filed at 7/15/2023 0826  Gross per 24 hour   Intake 2618 ml   Output 1725 ml   Net 893 ml      General: Patient alert and oriented no acute distress  Cardiac: Regular rate and rhythm without a rub no S3 or S4  Lungs: Clear bilaterally no wheezes rhonchi or rales  Abdomen: Bowel sounds positive nontender soft no mass felt no apparent organomegaly noted, overweight  Extremities: Legs are wrapped questionable edema   skin: No acute rashes noted  : Deferred  Neuro: Appears intact with motor and sensory grossly    Results from last 7 days   Lab Units 07/15/23  1206 07/15/23  0748 07/15/23  0433 07/14/23  0746 07/14/23  0357 07/13/23  2205   SODIUM mmol/L  --   --  138  --  138 136   POTASSIUM mmol/L 4.3 4.3 4.5   < > 5.4* 5.7*   CHLORIDE mmol/L  --   --  94*  --  101 103   CO2 mmol/L  --   --  28.8  --  19.4* 14.5*   BUN mg/dL  --   --  127*  --  133* 136*   CREATININE mg/dL  --   --  4.84*  --  5.09* " 5.48*   CALCIUM mg/dL  --   --  9.7  --  10.6* 10.8*   BILIRUBIN mg/dL  --   --  1.0  --  1.1 1.1   ALK PHOS U/L  --   --  97  --  104 100   ALT (SGPT) U/L  --   --  14  --  12 14   AST (SGOT) U/L  --   --  14  --  11 12   GLUCOSE mg/dL  --   --  120*  --  121* 122*    < > = values in this interval not displayed.       Estimated Creatinine Clearance: 19.7 mL/min (A) (by C-G formula based on SCr of 4.84 mg/dL (H)).    Results from last 7 days   Lab Units 07/15/23  0433   MAGNESIUM mg/dL 2.1   PHOSPHORUS mg/dL 6.1*             Results from last 7 days   Lab Units 07/15/23  0433 07/14/23  0357 07/13/23  2205 07/13/23  1615   WBC 10*3/mm3 10.91* 12.18* 8.30 10.24   HEMOGLOBIN g/dL 9.9* 10.6* 10.1* 10.3*   PLATELETS 10*3/mm3 168 188 156 194             Imaging Results (Last 24 Hours)       ** No results found for the last 24 hours. **             Patient Active Problem List   Diagnosis    Chronic heart failure with preserved ejection fraction (HFpEF)    CAD S/P percutaneous coronary angioplasty    Hyperlipidemia LDL goal <70    Essential hypertension    Kidney stones    Gout    Stage 3 chronic kidney disease    Vitamin D deficiency    Hyperkalemia       Assessment/plan:  1.- Acute kidney injury most likely secondary to hypotension and volume depletion.  This is worsened by large doses of diuretics and diarrhea.  Renal function starting to improve, will continue IV fluids but change to normal saline due to improvement of acidosis.  Continue avoid IV contrast nonsteroidals and other nephrotoxins at this time    2.- Life-threatening hyperkalemia secondary to combination of CKD, metabolic acidosis, olmesartan, lisinopril and spironolactone.  Improved, continue to hold these medicines for now  3.- Chronic kidney disease stage IIIb, baseline creatinine around 1.8, multifactorial.  4.- Congestive heart failure with coronary artery disease, sees cardiology as outpatient.  5.- Diabetes with early complications.  Per primary  6.-  Metabolic acidosis likely secondary to CKD and diarrhea possibly from metformin.  Bicarb improved on bicarb drip, probably secondary to significant renal insufficiency and GI loss.  Will check bicarb in a.m.  7.  Hypercalcemia, possibly secondary to volume contraction.  Resolved              Jonathan Bolanos MD

## 2023-07-16 VITALS
BODY MASS INDEX: 38.22 KG/M2 | HEART RATE: 83 BPM | HEIGHT: 72 IN | SYSTOLIC BLOOD PRESSURE: 111 MMHG | DIASTOLIC BLOOD PRESSURE: 42 MMHG | OXYGEN SATURATION: 93 % | TEMPERATURE: 98.6 F | RESPIRATION RATE: 16 BRPM | WEIGHT: 282.19 LBS

## 2023-07-16 LAB
ALBUMIN SERPL-MCNC: 3.9 G/DL (ref 3.5–5.2)
ALBUMIN/GLOB SERPL: 1.1 G/DL
ALP SERPL-CCNC: 97 U/L (ref 39–117)
ALT SERPL W P-5'-P-CCNC: 12 U/L (ref 1–41)
ANION GAP SERPL CALCULATED.3IONS-SCNC: 13.7 MMOL/L (ref 5–15)
AST SERPL-CCNC: 12 U/L (ref 1–40)
BILIRUB SERPL-MCNC: 1 MG/DL (ref 0–1.2)
BUN SERPL-MCNC: 108 MG/DL (ref 8–23)
BUN/CREAT SERPL: 25.7 (ref 7–25)
CALCIUM SPEC-SCNC: 9.5 MG/DL (ref 8.6–10.5)
CHLORIDE SERPL-SCNC: 96 MMOL/L (ref 98–107)
CO2 SERPL-SCNC: 29.3 MMOL/L (ref 22–29)
CREAT SERPL-MCNC: 4.2 MG/DL (ref 0.76–1.27)
DEPRECATED RDW RBC AUTO: 59.3 FL (ref 37–54)
EGFRCR SERPLBLD CKD-EPI 2021: 14.5 ML/MIN/1.73
ERYTHROCYTE [DISTWIDTH] IN BLOOD BY AUTOMATED COUNT: 17.1 % (ref 12.3–15.4)
GLOBULIN UR ELPH-MCNC: 3.7 GM/DL
GLUCOSE BLDC GLUCOMTR-MCNC: 105 MG/DL (ref 70–99)
GLUCOSE BLDC GLUCOMTR-MCNC: 119 MG/DL (ref 70–99)
GLUCOSE SERPL-MCNC: 112 MG/DL (ref 65–99)
HCT VFR BLD AUTO: 30.4 % (ref 37.5–51)
HGB BLD-MCNC: 9.7 G/DL (ref 13–17.7)
MAGNESIUM SERPL-MCNC: 1.9 MG/DL (ref 1.6–2.4)
MCH RBC QN AUTO: 30.5 PG (ref 26.6–33)
MCHC RBC AUTO-ENTMCNC: 31.9 G/DL (ref 31.5–35.7)
MCV RBC AUTO: 95.6 FL (ref 79–97)
PHOSPHATE SERPL-MCNC: 4.8 MG/DL (ref 2.5–4.5)
PLATELET # BLD AUTO: 158 10*3/MM3 (ref 140–450)
PMV BLD AUTO: 11.7 FL (ref 6–12)
POTASSIUM SERPL-SCNC: 4.5 MMOL/L (ref 3.5–5.2)
PROT SERPL-MCNC: 7.6 G/DL (ref 6–8.5)
RBC # BLD AUTO: 3.18 10*6/MM3 (ref 4.14–5.8)
SODIUM SERPL-SCNC: 139 MMOL/L (ref 136–145)
WBC NRBC COR # BLD: 9.84 10*3/MM3 (ref 3.4–10.8)

## 2023-07-16 PROCEDURE — 99239 HOSP IP/OBS DSCHRG MGMT >30: CPT | Performed by: INTERNAL MEDICINE

## 2023-07-16 PROCEDURE — 25010000002 HEPARIN (PORCINE) PER 1000 UNITS: Performed by: STUDENT IN AN ORGANIZED HEALTH CARE EDUCATION/TRAINING PROGRAM

## 2023-07-16 PROCEDURE — 82948 REAGENT STRIP/BLOOD GLUCOSE: CPT

## 2023-07-16 PROCEDURE — 36415 COLL VENOUS BLD VENIPUNCTURE: CPT | Performed by: STUDENT IN AN ORGANIZED HEALTH CARE EDUCATION/TRAINING PROGRAM

## 2023-07-16 PROCEDURE — 80053 COMPREHEN METABOLIC PANEL: CPT | Performed by: STUDENT IN AN ORGANIZED HEALTH CARE EDUCATION/TRAINING PROGRAM

## 2023-07-16 PROCEDURE — 83735 ASSAY OF MAGNESIUM: CPT | Performed by: INTERNAL MEDICINE

## 2023-07-16 PROCEDURE — 85027 COMPLETE CBC AUTOMATED: CPT | Performed by: INTERNAL MEDICINE

## 2023-07-16 PROCEDURE — 84100 ASSAY OF PHOSPHORUS: CPT | Performed by: INTERNAL MEDICINE

## 2023-07-16 RX ORDER — SODIUM BICARBONATE 650 MG/1
1300 TABLET ORAL 2 TIMES DAILY
Qty: 120 TABLET | Refills: 0 | Status: SHIPPED | OUTPATIENT
Start: 2023-07-16 | End: 2023-08-15

## 2023-07-16 RX ORDER — FUROSEMIDE 80 MG
40 TABLET ORAL 2 TIMES DAILY
Qty: 30 TABLET | Refills: 0 | Status: SHIPPED | OUTPATIENT
Start: 2023-07-16 | End: 2023-07-16 | Stop reason: SDUPTHER

## 2023-07-16 RX ORDER — FUROSEMIDE 80 MG
40 TABLET ORAL 2 TIMES DAILY
Qty: 30 TABLET | Refills: 0 | Status: SHIPPED | OUTPATIENT
Start: 2023-07-18 | End: 2023-08-17

## 2023-07-16 RX ORDER — SODIUM BICARBONATE 650 MG/1
1300 TABLET ORAL 2 TIMES DAILY
Status: DISCONTINUED | OUTPATIENT
Start: 2023-07-16 | End: 2023-07-16 | Stop reason: HOSPADM

## 2023-07-16 RX ADMIN — DOCUSATE SODIUM 50MG AND SENNOSIDES 8.6MG 2 TABLET: 8.6; 5 TABLET, FILM COATED ORAL at 09:01

## 2023-07-16 RX ADMIN — ASPIRIN 81 MG: 81 TABLET, COATED ORAL at 09:01

## 2023-07-16 RX ADMIN — ATORVASTATIN CALCIUM 40 MG: 40 TABLET, FILM COATED ORAL at 09:01

## 2023-07-16 RX ADMIN — SODIUM CHLORIDE 125 ML/HR: 9 INJECTION, SOLUTION INTRAVENOUS at 07:39

## 2023-07-16 RX ADMIN — HEPARIN SODIUM 5000 UNITS: 5000 INJECTION INTRAVENOUS; SUBCUTANEOUS at 09:01

## 2023-07-16 RX ADMIN — Medication 10 ML: at 09:02

## 2023-07-16 NOTE — DISCHARGE SUMMARY
[facility]         HOSPITALIST  DISCHARGE SUMMARY    Patient Name: Emmett Pandya  : 1950  MRN: [id number]    Date of Admission: 2023  Date of Discharge:  2023  Primary Care Physician: Roscoe Harrison Jr., MD    Consult:  Intensivist  Nephrology    Active and Resolved Hospital Problems:  Life-threatening hyperkalemia  DWAIN on CKD stage IIIb  Metabolic acidosis  Hypercalcemia  Nausea vomiting  Congestive heart failure  CAD  Diabetes         Hospital Course     Hospital Course:  Emmett Pandya is a 72 y.o. male with medical history of CKD stage III, hyperlipidemia, hypertension and diabetes mellitus type 2 presenting with weakness.  Patient states roughly 3 weeks ago he was started on metformin.  Since then he has been experiencing diarrhea, weakness, confusion and dizziness.  In ED, blood pressure 108/52, heart rate 65, respiratory of 18, temperature 97.4 and O2 saturation 96% on room air.  Labs on arrival showed a sodium 134, potassium 0.2, BUN 41, creatinine 5.8, glucose 136, WC 10.2, hemoglobin 10.3 and platelet count 194.  He was admitted to the ICU given hyperkalemia and acute renal failure.  Patient discharging off of olmesartan, lisinopril and spironolactone.  Patient's diuretics changed to 40 mg twice daily.  Patient will likely need ACE inhibitor or ARB in the future.  Patient was discharged off of his metformin given the complications he had on this medications.  Patient was also started on oral bicarbonate on discharge.  Patient will need to follow-up with PCP, nephrology as an outpatient.  Patient seen on date of discharge, clinically and hemodynamically stable.  Patient provided concerning signs and symptoms prompting immediate medical attention, patient understanding and agreeable    DISCHARGE Follow Up Recommendations for labs and diagnostics:   Follow-up with PCP soon as possible  Discharged off metformin given significant issues with this medication  Patient's  Lasix were changed, instructed to take 40 mg twice a day, instructed to start on Tuesday  Patient discharged off of ACE ARB and spironolactone, will likely need ACE or ARB in the future  Discharged on bicarbonate given metabolic acidosis    Day of Discharge     Vital Signs:  Temp:  [97.3 °F (36.3 °C)-98.6 °F (37 °C)] 98.6 °F (37 °C)  Heart Rate:  [72-92] 83  Resp:  [16] 16  BP: ()/(42-58) 111/42  Physical Exam:               Constitutional: Awake, alert, no acute distress              Eyes: Pupils equal, sclerae anicteric, no conjunctival injection              HENT: NCAT, mucous membranes moist              Neck: Supple, no thyromegaly, no lymphadenopathy, trachea midline              Respiratory: Clear to auscultation bilaterally, nonlabored respirations               Cardiovascular: RRR, no murmurs, rubs, or gallops, palpable pedal pulses bilaterally              Gastrointestinal: Positive bowel sounds, soft, nontender, nondistended              Musculoskeletal: Trace bilateral ankle edema, no clubbing or cyanosis to extremities              Psychiatric: Appropriate affect, cooperative              Neurologic: Oriented x 3, strength symmetric in all extremities, Cranial Nerves grossly intact to confrontation, speech clear              Skin: No rashes     Discharge Details        Discharge Medications        New Medications        Instructions Start Date   sodium bicarbonate 650 MG tablet   1,300 mg, Oral, 2 Times Daily             Changes to Medications        Instructions Start Date   furosemide 80 MG tablet  Commonly known as: LASIX  What changed:   how much to take  when to take this  These instructions start on July 18, 2023. If you are unsure what to do until then, ask your doctor or other care provider.  Another medication with the same name was removed. Continue taking this medication, and follow the directions you see here.   40 mg, Oral, 2 Times Daily, 2 tabs po q am and 1 po qpm   Start Date: July  18, 2023            Continue These Medications        Instructions Start Date   allopurinol 100 MG tablet  Commonly known as: ZYLOPRIM   100 mg, Oral, Daily      aspirin 81 MG EC tablet   81 mg, Oral, Daily      atorvastatin 40 MG tablet  Commonly known as: LIPITOR   TAKE 1 TABLET BY MOUTH EVERY DAY      carvedilol 12.5 MG tablet  Commonly known as: COREG   TAKE 1 TABLET BY MOUTH TWICE A DAY             Stop These Medications      lisinopril 30 MG tablet  Commonly known as: PRINIVIL,ZESTRIL     metFORMIN 500 MG tablet  Commonly known as: GLUCOPHAGE     olmesartan 40 MG tablet  Commonly known as: BENICAR     spironolactone 50 MG tablet  Commonly known as: ALDACTONE              No Known Allergies    Discharge Disposition:  Home or Self Care    Diet:  Hospital:  Diet Order   Procedures    Diet: Diabetic Diets; Consistent Carbohydrate; Texture: Regular Texture (IDDSI 7); Fluid Consistency: Thin (IDDSI 0)       Discharge Activity:   Activity Instructions       Activity as Tolerated              CODE STATUS:  Code Status and Medical Interventions:   Ordered at: 07/13/23 2029     Code Status (Patient has no pulse and is not breathing):    CPR (Attempt to Resuscitate)     Medical Interventions (Patient has pulse or is breathing):    Full Support         Future Appointments   Date Time Provider Department Center   9/7/2023 11:00 AM Marco Karmer MD [facility] [place] [facility] [place] [facility]       Additional Instructions for the Follow-ups that You Need to Schedule       Discharge Follow-up with PCP   As directed       Currently Documented PCP:    Roscoe Harrison Jr., MD    PCP Phone Number:    [phone]     Follow Up Details: In less than one week         Discharge Follow-up with Specified Provider: Nephrology; 1 Week   As directed      To: Nephrology    Follow Up: 1 Week                 Pertinent  and/or Most Recent Results     PROCEDURES:   NA    LAB RESULTS:      Lab 07/16/23  0845 07/15/23  0433 07/14/23  1124 07/14/23  0357 07/13/23  4481  07/13/23  1615   WBC 9.84 10.91*  --  12.18* 8.30 10.24   HEMOGLOBIN 9.7* 9.9*  --  10.6* 10.1* 10.3*   HEMATOCRIT 30.4* 31.1*  --  32.8* 34.1* 33.3*   PLATELETS 158 168  --  188 156 194   NEUTROS ABS  --  7.87*  --  9.71* 6.30 8.71*   IMMATURE GRANS (ABS)  --  0.04  --  0.03 0.02 0.03   LYMPHS ABS  --  1.60  --  1.31 1.16 0.87   MONOS ABS  --  0.99*  --  0.83 0.63 0.45   EOS ABS  --  0.37  --  0.25 0.15 0.12   MCV 95.6 94.5  --  93.7 102.7* 99.1*   LACTATE  --   --  1.1  --   --   --          Lab 07/16/23  0845 07/15/23  1206 07/15/23  0748 07/15/23  0433 07/14/23  2359 07/14/23  0746 07/14/23  0357 07/13/23  2205 07/13/23  1733   SODIUM 139  --   --  138  --   --  138 136 134*   POTASSIUM 4.5 4.3 4.3 4.5 5.0   < > 5.4* 5.7* 7.2*   CHLORIDE 96*  --   --  94*  --   --  101 103 101   CO2 29.3*  --   --  28.8  --   --  19.4* 14.5* 17.2*   ANION GAP 13.7  --   --  15.2*  --   --  17.6* 18.5* 15.8*   *  --   --  127*  --   --  133* 136* 141*   CREATININE 4.20*  --   --  4.84*  --   --  5.09* 5.48* 5.82*   EGFR 14.5*  --   --  12.2*  --   --  11.5* 10.5* 9.8*   GLUCOSE 112*  --   --  120*  --   --  121* 122* 136*   CALCIUM 9.5  --   --  9.7  --   --  10.6* 10.8* 10.6*   MAGNESIUM 1.9  --   --  2.1  --   --   --   --   --    PHOSPHORUS 4.8*  --   --  6.1*  --   --   --   --   --     < > = values in this interval not displayed.         Lab 07/16/23  0845 07/15/23  0433 07/14/23  0357 07/13/23  2205 07/13/23  1733   TOTAL PROTEIN 7.6 7.6 8.2 8.0 8.2   ALBUMIN 3.9 3.8 4.1 3.9 4.2   GLOBULIN 3.7 3.8 4.1 4.1 4.0   ALT (SGPT) 12 14 12 14 13   AST (SGOT) 12 14 11 12 11   BILIRUBIN 1.0 1.0 1.1 1.1 1.0   ALK PHOS 97 97 104 100 102         Lab 07/13/23  1733 07/13/23  1615   PROBNP  --  195.9   HSTROP T 46*  --                  Lab 07/13/23  2130   PH, ARTERIAL 7.319*   PCO2, ARTERIAL 37.2   PO2 ART 77.9*   O2 SATURATION ART 94.4*   FIO2 21   HCO3 ART 18.7*   BASE EXCESS ART -6.8*   CARBOXYHEMOGLOBIN 0.0     Brief Urine Lab  Results       None          Microbiology Results (last 10 days)       ** No results found for the last 240 hours. **            XR Chest 1 View    Result Date: 7/13/2023  Impression:   1. No acute cardiopulmonary disease.       GAYATHRI RONDON MD       Electronically Signed and Approved By: GAYATHRI RONDON MD on 7/13/2023 at 16:55                          Labs Pending at Discharge:        Time spent on Discharge including face to face service:  40 minutes    Electronically signed by Jozef Matthew MD, 07/16/23, 1:50 PM EDT.

## 2023-07-16 NOTE — PLAN OF CARE
Goal Outcome Evaluation:  Plan of Care Reviewed With: patient, spouse        Progress: improving  Outcome Evaluation: patient is alert and oriented, no c/o pain. will discharge home this shift

## 2023-07-16 NOTE — PROGRESS NOTES
"Nephrology progress note    Jonathan Bolanos MD     Current history: Patient  without any nausea vomiting chest pain shortness of breath or abdominal pain.  Patient states he  really wants to go home, family is at bedside.    Current medication list:  aspirin, 81 mg, Oral, Daily  atorvastatin, 40 mg, Oral, Daily  heparin (porcine), 5,000 Units, Subcutaneous, Q12H  insulin lispro, 2-7 Units, Subcutaneous, 4x Daily AC & at Bedtime  senna-docusate sodium, 2 tablet, Oral, BID  sodium chloride, 10 mL, Intravenous, Q12H         senna-docusate sodium **AND** polyethylene glycol **AND** bisacodyl **AND** bisacodyl    dextrose    dextrose    glucagon (human recombinant)    nitroglycerin    ondansetron    sodium chloride    sodium chloride    sodium chloride     Physical Exam:  /42 (BP Location: Left arm, Patient Position: Sitting)   Pulse 83   Temp 98.6 °F (37 °C) (Oral)   Resp 16   Ht 182.9 cm (72\")   Wt 128 kg (282 lb 3 oz)   SpO2 93%   BMI 38.27 kg/m²     Intake/Output Summary (Last 24 hours) at 7/16/2023 1331  Last data filed at 7/16/2023 1108  Gross per 24 hour   Intake 2021 ml   Output 1250 ml   Net 771 ml        General: Patient alert and oriented no acute distress  Cardiac: Regular rate and rhythm without a rub no S3 or S4  Lungs: Clear bilaterally no wheezes rhonchi or rales  Abdomen: Bowel sounds positive nontender soft no mass felt no apparent organomegaly noted, overweight  Extremities: Legs are wrapped, mild edema  skin: No acute rashes noted  : Deferred  Neuro: Appears intact with motor and sensory grossly    Results from last 7 days   Lab Units 07/16/23  0845 07/15/23  1206 07/15/23  0748 07/15/23  0433 07/14/23  0746 07/14/23  0357   SODIUM mmol/L 139  --   --  138  --  138   POTASSIUM mmol/L 4.5 4.3 4.3 4.5   < > 5.4*   CHLORIDE mmol/L 96*  --   --  94*  --  101   CO2 mmol/L 29.3*  --   --  28.8  --  19.4*   BUN mg/dL 108*  --   --  127*  --  133*   CREATININE mg/dL 4.20*  --   --  4.84*  " --  5.09*   CALCIUM mg/dL 9.5  --   --  9.7  --  10.6*   BILIRUBIN mg/dL 1.0  --   --  1.0  --  1.1   ALK PHOS U/L 97  --   --  97  --  104   ALT (SGPT) U/L 12  --   --  14  --  12   AST (SGOT) U/L 12  --   --  14  --  11   GLUCOSE mg/dL 112*  --   --  120*  --  121*    < > = values in this interval not displayed.         Estimated Creatinine Clearance: 22.6 mL/min (A) (by C-G formula based on SCr of 4.2 mg/dL (H)).    Results from last 7 days   Lab Units 07/16/23  0845 07/15/23  0433   MAGNESIUM mg/dL 1.9 2.1   PHOSPHORUS mg/dL 4.8* 6.1*               Results from last 7 days   Lab Units 07/16/23  0845 07/15/23  0433 07/14/23  0357 07/13/23  2205 07/13/23  1615   WBC 10*3/mm3 9.84 10.91* 12.18* 8.30 10.24   HEMOGLOBIN g/dL 9.7* 9.9* 10.6* 10.1* 10.3*   PLATELETS 10*3/mm3 158 168 188 156 194               Imaging Results (Last 24 Hours)       ** No results found for the last 24 hours. **             Patient Active Problem List   Diagnosis    Chronic heart failure with preserved ejection fraction (HFpEF)    CAD S/P percutaneous coronary angioplasty    Hyperlipidemia LDL goal <70    Essential hypertension    Kidney stones    Gout    Stage 3 chronic kidney disease    Vitamin D deficiency    Hyperkalemia       Assessment/plan:  1.- Acute kidney injury most likely secondary to hypotension and volume depletion.  This is worsened by large doses of diuretics and diarrhea.  Renal function although still not very good is improving.  Discussed with patient about going home and if he did he needs to follow-up this week with labs in our office.  Went over and wrote down for patient not to take olmesartan or spironolactone or lisinopril at home, also instructed the nurse to make sure he does not take the metformin either.  Told him to restart Lasix probably on Tuesday at 40 mg twice a day.  I guess it is okay to go home from my standpoint.  Continue avoid IV contrast nonsteroidals and other nephrotoxins at this time    2.-  Life-threatening hyperkalemia secondary to combination of CKD, metabolic acidosis, olmesartan, lisinopril and spironolactone.  Improved, continue to hold these medicines for now  3.- Chronic kidney disease stage IIIb, baseline creatinine around 1.8, multifactorial.  4.- Congestive heart failure with coronary artery disease, sees cardiology as outpatient.  Diuretics as above Lasix 40 mg twice daily starting on Tuesday will probably want to reintroduce either an ACE inhibitor or an angiotensin receptor blocker at some point but not currently  5.- Diabetes with early complications.  No metformin upon discharge per primary  6.- Metabolic acidosis likely secondary to CKD and diarrhea possibly from metformin.  We will continue oral bicarbonate at home which, to start 652 p.o. twice daily   7.  Hypercalcemia, possibly secondary to volume contraction.  Resolved              Jonathan Bolanos MD

## 2023-09-07 ENCOUNTER — OFFICE VISIT (OUTPATIENT)
Dept: CARDIOLOGY | Facility: CLINIC | Age: 73
End: 2023-09-07
Payer: MEDICARE

## 2023-09-07 VITALS
OXYGEN SATURATION: 93 % | HEIGHT: 72 IN | HEART RATE: 71 BPM | SYSTOLIC BLOOD PRESSURE: 144 MMHG | DIASTOLIC BLOOD PRESSURE: 68 MMHG | WEIGHT: 286 LBS | TEMPERATURE: 97.8 F | BODY MASS INDEX: 38.74 KG/M2

## 2023-09-07 DIAGNOSIS — E78.5 HYPERLIPIDEMIA LDL GOAL <70: Primary | ICD-10-CM

## 2023-09-07 DIAGNOSIS — Z98.61 CAD S/P PERCUTANEOUS CORONARY ANGIOPLASTY: ICD-10-CM

## 2023-09-07 DIAGNOSIS — I50.32 CHRONIC HEART FAILURE WITH PRESERVED EJECTION FRACTION (HFPEF): ICD-10-CM

## 2023-09-07 DIAGNOSIS — I10 ESSENTIAL HYPERTENSION: ICD-10-CM

## 2023-09-07 DIAGNOSIS — I25.10 CAD S/P PERCUTANEOUS CORONARY ANGIOPLASTY: ICD-10-CM

## 2023-09-07 RX ORDER — SPIRONOLACTONE 50 MG/1
100 TABLET, FILM COATED ORAL EVERY MORNING
COMMUNITY
Start: 2023-08-16

## 2023-09-07 RX ORDER — ERGOCALCIFEROL 1.25 MG/1
50000 CAPSULE ORAL
COMMUNITY
Start: 2023-09-06 | End: 2024-09-05

## 2023-09-07 NOTE — PROGRESS NOTES
Chief Complaint  Follow-up (6 MO)    Subjective    Patient is following up did have an admission back in July for acute renal failure and hyperkalemia and was stopped on several of his blood pressure medications.  He reports feeling better since then still with chronic lower extremity edema but stable breathing.  His weight is up about 4 pounds since his last visit.  He has not had any problems with chest discomfort    Past Medical History:   Diagnosis Date    CHF (congestive heart failure)     Chronic heart failure with preserved ejection fraction (HFpEF) 12/3/2021    Chronic kidney disease     Hyperlipidemia     Hypertension          Current Outpatient Medications:     allopurinol (ZYLOPRIM) 100 MG tablet, Take 3 tablets by mouth Daily., Disp: , Rfl:     aspirin 81 MG EC tablet, Take 1 tablet by mouth Daily., Disp: , Rfl:     atorvastatin (LIPITOR) 40 MG tablet, TAKE 1 TABLET BY MOUTH EVERY DAY, Disp: 90 tablet, Rfl: 1    carvedilol (COREG) 12.5 MG tablet, TAKE 1 TABLET BY MOUTH TWICE A DAY, Disp: 180 tablet, Rfl: 2    furosemide (LASIX) 80 MG tablet, Take 0.5 tablets by mouth 2 (Two) Times a Day for 30 days. 2 tabs po q am and 1 po qpm (Patient taking differently: Take 1 tablet by mouth 2 (Two) Times a Day. 2 tabs po q am and 1 po qpm), Disp: 30 tablet, Rfl: 0    spironolactone (ALDACTONE) 50 MG tablet, Take 2 tablets by mouth Every Morning., Disp: , Rfl:     vitamin D (ERGOCALCIFEROL) 1.25 MG (26991 UT) capsule capsule, Take 1 capsule by mouth Every 7 (Seven) Days., Disp: , Rfl:     There are no discontinued medications.  No Known Allergies     Social History     Tobacco Use    Smoking status: Former     Packs/day: 2.00     Types: Cigarettes     Start date:      Quit date: 2006     Years since quittin.7    Smokeless tobacco: Never   Vaping Use    Vaping Use: Never used   Substance Use Topics    Alcohol use: Never    Drug use: Never       Family History   Problem Relation Age of Onset    No Known  "Problems Mother     No Known Problems Father         Objective     /68 (BP Location: Left arm, Patient Position: Sitting, Cuff Size: Adult)   Pulse 71   Temp 97.8 °F (36.6 °C) (Temporal)   Ht 182.9 cm (72\")   Wt 130 kg (286 lb)   SpO2 93%   BMI 38.79 kg/m²       Physical Exam    General Appearance:   no acute distress  Alert and oriented x3  HENT:   lips not cyanotic  Atraumatic  Neck:  No jvd   supple  Respiratory:  no respiratory distress  normal breath sounds  no rales  Cardiovascular:  Regular rate and rhythm  no S3, no S4   no murmur  no rub  Extremities  No cyanosis  lower extremity edema +2 bilateral wrap son's feet  Skin:   warm, dry  No rashes      Result Review :     proBNP   Date Value Ref Range Status   07/13/2023 195.9 0.0 - 900.0 pg/mL Final     CMP          7/14/2023    03:57 7/14/2023    07:46 7/14/2023    11:24 7/14/2023    15:31 7/14/2023    23:59 7/15/2023    04:33 7/15/2023    07:48 7/15/2023    12:06 7/16/2023    08:45   CMP   Glucose 121      120    112          127    108    Creatinine 5.09      4.84    4.20    EGFR 11.5      12.2    14.5    Sodium 138      138    139    Potassium 5.4  5.4  4.9  5.6  5.0  4.5  4.3  4.3  4.5    Chloride 101      94    96    Calcium 10.6      9.7    9.5    Total Protein 8.2      7.6    7.6    Albumin 4.1      3.8    3.9    Globulin 4.1      3.8    3.7    Total Bilirubin 1.1      1.0    1.0    Alkaline Phosphatase 104      97    97    AST (SGOT) 11      14    12    ALT (SGPT) 12      14    12    Albumin/Globulin Ratio 1.0      1.0    1.1    BUN/Creatinine Ratio 26.1      26.2    25.7    Anion Gap 17.6      15.2    13.7      CBC w/diff          7/14/2023    03:57 7/15/2023    04:33 7/16/2023    08:45   CBC w/Diff   WBC 12.18  10.91  9.84    RBC 3.50  3.29  3.18    Hemoglobin 10.6  9.9  9.7    Hematocrit 32.8  31.1  30.4    MCV 93.7  94.5  95.6    MCH 30.3  30.1  30.5    MCHC 32.3  31.8  31.9    RDW 16.9  16.8  17.1    Platelets 188  168  158  "   Neutrophil Rel % 79.7  72.0     Immature Granulocyte Rel % 0.2  0.4     Lymphocyte Rel % 10.8  14.7     Monocyte Rel % 6.8  9.1     Eosinophil Rel % 2.1  3.4     Basophil Rel % 0.4  0.4        Lab Results   Component Value Date    TSH 3.000 08/09/2019      Lab Results   Component Value Date    FREET4 1.0 08/09/2019      No results found for: DDIMERQUANT  Magnesium   Date Value Ref Range Status   07/16/2023 1.9 1.6 - 2.4 mg/dL Final      No results found for: DIGOXIN   Lab Results   Component Value Date    TROPONINT 46 (H) 07/13/2023             No results found for: POCTROP                   There are no diagnoses linked to this encounter.        Follow Up     No follow-ups on file.          Patient was given instructions and counseling regarding his condition or for health maintenance advice. Please see specific information pulled into the AVS if appropriate.

## 2023-09-07 NOTE — ASSESSMENT & PLAN NOTE
Mild systolic elevation but blood pressure reasonable overall we will continue for the time being with his correctional 0.5 twice daily and spironolactone 100 daily given his recent hypotension episodes did not titrate up further at this point

## 2023-09-07 NOTE — ASSESSMENT & PLAN NOTE
Patient appears stable breathing wise underweight still with significant lower extremity edema Limited due to chronic renal failure though currently being followed and managed by nephrology.  He is on Lasix 40 twice daily dosing and spironolactone 100 daily

## 2023-09-19 DIAGNOSIS — E78.5 HYPERLIPIDEMIA LDL GOAL <70: ICD-10-CM

## 2024-01-08 RX ORDER — CARVEDILOL 12.5 MG/1
TABLET ORAL
Qty: 180 TABLET | Refills: 2 | Status: SHIPPED | OUTPATIENT
Start: 2024-01-08

## 2024-03-20 ENCOUNTER — OFFICE VISIT (OUTPATIENT)
Dept: CARDIOLOGY | Facility: CLINIC | Age: 74
End: 2024-03-20
Payer: MEDICARE

## 2024-03-20 VITALS
BODY MASS INDEX: 37.65 KG/M2 | HEART RATE: 68 BPM | SYSTOLIC BLOOD PRESSURE: 129 MMHG | DIASTOLIC BLOOD PRESSURE: 76 MMHG | WEIGHT: 278 LBS | HEIGHT: 72 IN

## 2024-03-20 DIAGNOSIS — I50.32 CHRONIC HEART FAILURE WITH PRESERVED EJECTION FRACTION (HFPEF): Primary | ICD-10-CM

## 2024-03-20 DIAGNOSIS — I25.10 CAD S/P PERCUTANEOUS CORONARY ANGIOPLASTY: ICD-10-CM

## 2024-03-20 DIAGNOSIS — Z98.61 CAD S/P PERCUTANEOUS CORONARY ANGIOPLASTY: ICD-10-CM

## 2024-03-20 DIAGNOSIS — E78.5 HYPERLIPIDEMIA LDL GOAL <70: ICD-10-CM

## 2024-03-20 PROCEDURE — 3078F DIAST BP <80 MM HG: CPT | Performed by: INTERNAL MEDICINE

## 2024-03-20 PROCEDURE — 3074F SYST BP LT 130 MM HG: CPT | Performed by: INTERNAL MEDICINE

## 2024-03-20 PROCEDURE — 99214 OFFICE O/P EST MOD 30 MIN: CPT | Performed by: INTERNAL MEDICINE

## 2024-03-20 RX ORDER — EMPAGLIFLOZIN 10 MG/1
10 TABLET, FILM COATED ORAL DAILY
COMMUNITY
Start: 2024-03-14

## 2024-03-20 RX ORDER — DULAGLUTIDE 0.75 MG/.5ML
INJECTION, SOLUTION SUBCUTANEOUS
COMMUNITY
Start: 2024-03-12

## 2024-03-20 NOTE — ASSESSMENT & PLAN NOTE
Patient stable symptomatically weight is down about 8 pounds continue with the spironolactone 50 mg daily as well as Lasix 40 mg twice daily dosing.  Continue encourage the bilateral leg wraps for edema control

## 2024-03-20 NOTE — PROGRESS NOTES
Chief Complaint  Follow-up, Hyperlipidemia, Coronary Artery Disease, and Hypertension    Subjective    Patient without any new problems doing well weight is down about a pound since last visit reports stable lower extremity edema  Past Medical History:   Diagnosis Date    CHF (congestive heart failure)     Chronic heart failure with preserved ejection fraction (HFpEF) 12/3/2021    Chronic kidney disease     Hyperlipidemia     Hypertension          Current Outpatient Medications:     allopurinol (ZYLOPRIM) 100 MG tablet, Take 3 tablets by mouth Daily., Disp: , Rfl:     aspirin 81 MG EC tablet, Take 1 tablet by mouth Daily., Disp: , Rfl:     atorvastatin (LIPITOR) 40 MG tablet, TAKE 1 TABLET BY MOUTH EVERY DAY, Disp: 90 tablet, Rfl: 1    carvedilol (COREG) 12.5 MG tablet, TAKE 1 TABLET BY MOUTH TWICE A DAY, Disp: 180 tablet, Rfl: 2    furosemide (LASIX) 80 MG tablet, Take 0.5 tablets by mouth 2 (Two) Times a Day for 30 days. 2 tabs po q am and 1 po qpm (Patient taking differently: Take 1 tablet by mouth 2 (Two) Times a Day. 2 tabs po q am and 1 po qpm), Disp: 30 tablet, Rfl: 0    Jardiance 10 MG tablet tablet, Take 1 tablet by mouth Daily., Disp: , Rfl:     spironolactone (ALDACTONE) 50 MG tablet, Take 2 tablets by mouth Every Morning., Disp: , Rfl:     Trulicity 0.75 MG/0.5ML solution pen-injector, ADMINISTER 0.75 MG UNDER THE SKIN 1 TIME WEEKLY, Disp: , Rfl:     vitamin D (ERGOCALCIFEROL) 1.25 MG (27062 UT) capsule capsule, Take 1 capsule by mouth Every 7 (Seven) Days., Disp: , Rfl:     There are no discontinued medications.  No Known Allergies     Social History     Tobacco Use    Smoking status: Former     Current packs/day: 0.00     Average packs/day: 2.0 packs/day for 39.9 years (79.9 ttl pk-yrs)     Types: Cigarettes     Start date:      Quit date: 2006     Years since quittin.2    Smokeless tobacco: Never   Vaping Use    Vaping status: Never Used   Substance Use Topics    Alcohol use: Never     "Drug use: Never       Family History   Problem Relation Age of Onset    No Known Problems Mother     No Known Problems Father         Objective     /76   Pulse 68   Ht 182.9 cm (72\")   Wt 126 kg (278 lb)   BMI 37.70 kg/m²       Physical Exam    General Appearance:   no acute distress  Alert and oriented x3  HENT:   lips not cyanotic  Atraumatic  Neck:  No jvd   supple  Respiratory:  no respiratory distress  normal breath sounds  no rales  Cardiovascular:  Regular rate and rhythm  no S3, no S4   no murmur  no rub  Extremities  No cyanosis  lower extremity edema: 2+ bilateral leg wraps  Skin:   warm, dry  No rashes      Result Review :     proBNP   Date Value Ref Range Status   07/13/2023 195.9 0.0 - 900.0 pg/mL Final     CMP          7/14/2023    03:57 7/14/2023    07:46 7/14/2023    11:24 7/14/2023    15:31 7/14/2023    23:59 7/15/2023    04:33 7/15/2023    07:48 7/15/2023    12:06 7/16/2023    08:45   CMP   Glucose 121      120    112          127    108    Creatinine 5.09      4.84    4.20    EGFR 11.5      12.2    14.5    Sodium 138      138    139    Potassium 5.4  5.4  4.9  5.6  5.0  4.5  4.3  4.3  4.5    Chloride 101      94    96    Calcium 10.6      9.7    9.5    Total Protein 8.2      7.6    7.6    Albumin 4.1      3.8    3.9    Globulin 4.1      3.8    3.7    Total Bilirubin 1.1      1.0    1.0    Alkaline Phosphatase 104      97    97    AST (SGOT) 11      14    12    ALT (SGPT) 12      14    12    Albumin/Globulin Ratio 1.0      1.0    1.1    BUN/Creatinine Ratio 26.1      26.2    25.7    Anion Gap 17.6      15.2    13.7      CBC w/diff          7/14/2023    03:57 7/15/2023    04:33 7/16/2023    08:45   CBC w/Diff   WBC 12.18  10.91  9.84    RBC 3.50  3.29  3.18    Hemoglobin 10.6  9.9  9.7    Hematocrit 32.8  31.1  30.4    MCV 93.7  94.5  95.6    MCH 30.3  30.1  30.5    MCHC 32.3  31.8  31.9    RDW 16.9  16.8  17.1    Platelets 188  168  158    Neutrophil Rel % 79.7  72.0     Immature " "Granulocyte Rel % 0.2  0.4     Lymphocyte Rel % 10.8  14.7     Monocyte Rel % 6.8  9.1     Eosinophil Rel % 2.1  3.4     Basophil Rel % 0.4  0.4        Lab Results   Component Value Date    TSH 3.000 08/09/2019      Lab Results   Component Value Date    FREET4 1.0 08/09/2019      No results found for: \"DDIMERQUANT\"  Magnesium   Date Value Ref Range Status   07/16/2023 1.9 1.6 - 2.4 mg/dL Final      No results found for: \"DIGOXIN\"   Lab Results   Component Value Date    TROPONINT 46 (H) 07/13/2023             No results found for: \"POCTROP\"                   Diagnoses and all orders for this visit:    1. Chronic heart failure with preserved ejection fraction (HFpEF) (Primary)  Assessment & Plan:  Patient stable symptomatically weight is down about 8 pounds continue with the spironolactone 50 mg daily as well as Lasix 40 mg twice daily dosing.  Continue encourage the bilateral leg wraps for edema control      2. CAD S/P percutaneous coronary angioplasty  Assessment & Plan:  Patient is doing well no ongoing angina continue with chronic aspirin 81 mg daily        3. Hyperlipidemia LDL goal <70  Assessment & Plan:  Continue with Lipitor 40 nightly LDL goal               Follow Up     Return in about 6 months (around 9/20/2024) for Follow with Haven Helms.          Patient was given instructions and counseling regarding his condition or for health maintenance advice. Please see specific information pulled into the AVS if appropriate.       "

## 2024-05-31 RX ORDER — CARVEDILOL 12.5 MG/1
TABLET ORAL
Qty: 180 TABLET | Refills: 2 | Status: SHIPPED | OUTPATIENT
Start: 2024-05-31

## 2024-09-24 ENCOUNTER — OFFICE VISIT (OUTPATIENT)
Dept: CARDIOLOGY | Facility: CLINIC | Age: 74
End: 2024-09-24
Payer: MEDICARE

## 2024-09-24 VITALS
DIASTOLIC BLOOD PRESSURE: 59 MMHG | HEART RATE: 77 BPM | WEIGHT: 268.4 LBS | BODY MASS INDEX: 36.35 KG/M2 | SYSTOLIC BLOOD PRESSURE: 115 MMHG | HEIGHT: 72 IN

## 2024-09-24 DIAGNOSIS — E78.5 HYPERLIPIDEMIA LDL GOAL <70: ICD-10-CM

## 2024-09-24 DIAGNOSIS — Z98.61 CAD S/P PERCUTANEOUS CORONARY ANGIOPLASTY: Primary | ICD-10-CM

## 2024-09-24 DIAGNOSIS — I25.10 CAD S/P PERCUTANEOUS CORONARY ANGIOPLASTY: Primary | ICD-10-CM

## 2024-09-24 DIAGNOSIS — I10 ESSENTIAL HYPERTENSION: ICD-10-CM

## 2024-09-24 DIAGNOSIS — I50.32 CHRONIC HEART FAILURE WITH PRESERVED EJECTION FRACTION (HFPEF): ICD-10-CM

## 2024-09-24 RX ORDER — SPIRONOLACTONE 100 MG/1
100 TABLET, FILM COATED ORAL 2 TIMES DAILY
Qty: 180 TABLET | Refills: 1 | Status: SHIPPED | OUTPATIENT
Start: 2024-09-24

## 2024-09-24 RX ORDER — DAPAGLIFLOZIN 10 MG/1
1 TABLET, FILM COATED ORAL DAILY
COMMUNITY
Start: 2024-09-16

## 2024-09-24 RX ORDER — GLIPIZIDE 5 MG/1
5 TABLET ORAL DAILY
COMMUNITY
Start: 2024-09-04

## 2024-09-24 RX ORDER — SEMAGLUTIDE 0.68 MG/ML
0.25 INJECTION, SOLUTION SUBCUTANEOUS WEEKLY
COMMUNITY
Start: 2024-09-04

## 2024-09-24 RX ORDER — ERGOCALCIFEROL 1.25 MG/1
50000 CAPSULE, LIQUID FILLED ORAL WEEKLY
COMMUNITY

## 2024-09-24 RX ORDER — FUROSEMIDE 80 MG
80 TABLET ORAL DAILY
Qty: 90 TABLET | Refills: 1 | Status: SHIPPED | OUTPATIENT
Start: 2024-09-24 | End: 2024-09-26 | Stop reason: SDUPTHER

## 2024-09-24 NOTE — PROGRESS NOTES
Chief Complaint  Follow-up, Coronary Artery Disease, Hyperlipidemia, and Hypertension    Subjective            History of Present Illness  Emmett Pandya is a 73-year-old male patient who presents to the office today for follow up. He has CAD with prior stenting, CHF, hypertension, and hyperlipidemia. He is compliant with medications. He denies any new or worsening cardiac symptoms today.    PMH  Past Medical History:   Diagnosis Date    CHF (congestive heart failure)     Chronic heart failure with preserved ejection fraction (HFpEF) 12/3/2021    Chronic kidney disease     Hyperlipidemia     Hypertension          ALLERGY  No Known Allergies       SURGICALHX  Past Surgical History:   Procedure Laterality Date    CARDIAC CATHETERIZATION      CORONARY STENT PLACEMENT            SOC  Social History     Socioeconomic History    Marital status: Single   Tobacco Use    Smoking status: Former     Current packs/day: 0.00     Average packs/day: 2.0 packs/day for 39.9 years (79.9 ttl pk-yrs)     Types: Cigarettes     Start date:      Quit date: 2006     Years since quittin.8    Smokeless tobacco: Never   Vaping Use    Vaping status: Never Used   Substance and Sexual Activity    Alcohol use: Never    Drug use: Never    Sexual activity: Defer         FAMHX  Family History   Problem Relation Age of Onset    No Known Problems Mother     No Known Problems Father           MEDSIGONLY  Current Outpatient Medications on File Prior to Visit   Medication Sig    allopurinol (ZYLOPRIM) 100 MG tablet Take 3 tablets by mouth Daily.    aspirin 81 MG EC tablet Take 1 tablet by mouth Daily.    atorvastatin (LIPITOR) 40 MG tablet TAKE 1 TABLET BY MOUTH EVERY DAY    carvedilol (COREG) 12.5 MG tablet TAKE 1 TABLET BY MOUTH TWICE A DAY    dapagliflozin Propanediol 10 MG tablet Take 10 mg by mouth Daily.    glipizide (GLUCOTROL) 5 MG tablet Take 1 tablet by mouth Daily.    Jardiance 10 MG tablet tablet Take 1 tablet by mouth Daily.     "Ozempic, 0.25 or 0.5 MG/DOSE, 2 MG/3ML solution pen-injector Inject 0.25 mg under the skin into the appropriate area as directed 1 (One) Time Per Week. Please see attached for detailed directions    spironolactone (ALDACTONE) 50 MG tablet Take 2 tablets by mouth Every Morning.    vitamin D (ERGOCALCIFEROL) 1.25 MG (00323 UT) capsule capsule Take 1 capsule by mouth 1 (One) Time Per Week.    furosemide (LASIX) 80 MG tablet Take 0.5 tablets by mouth 2 (Two) Times a Day for 30 days. 2 tabs po q am and 1 po qpm (Patient taking differently: Take 1 tablet by mouth 2 (Two) Times a Day. 2 tabs po q am and 1 po qpm)    [DISCONTINUED] Trulicity 0.75 MG/0.5ML solution pen-injector ADMINISTER 0.75 MG UNDER THE SKIN 1 TIME WEEKLY     No current facility-administered medications on file prior to visit.         Objective   /59   Pulse 77   Ht 182.9 cm (72.01\")   Wt 122 kg (268 lb 6.4 oz)   BMI 36.39 kg/m²       Physical Exam  Constitutional:       Appearance: He is obese.   HENT:      Head: Normocephalic.   Neck:      Vascular: No carotid bruit.   Cardiovascular:      Rate and Rhythm: Normal rate and regular rhythm.      Pulses: Normal pulses.      Heart sounds: Normal heart sounds. No murmur heard.  Pulmonary:      Effort: Pulmonary effort is normal.      Breath sounds: Normal breath sounds.   Musculoskeletal:      Cervical back: Neck supple.      Right lower leg: No edema.      Left lower leg: No edema.   Skin:     General: Skin is dry.   Neurological:      Mental Status: He is alert and oriented to person, place, and time.   Psychiatric:         Behavior: Behavior normal.       Result Review :   The following data was reviewed by: LOTUS Murdock on 09/24/2024:  proBNP   Date Value Ref Range Status   07/13/2023 195.9 0.0 - 900.0 pg/mL Final          Lab Results   Component Value Date    TSH 3.000 08/09/2019      Lab Results   Component Value Date    FREET4 1.0 08/09/2019      No results found for: " "\"DDIMERQUANT\"  Magnesium   Date Value Ref Range Status   07/16/2023 1.9 1.6 - 2.4 mg/dL Final      No results found for: \"DIGOXIN\"   Lab Results   Component Value Date    TROPONINT 46 (H) 07/13/2023                Assessment and Plan    Diagnoses and all orders for this visit:    1. CAD S/P percutaneous coronary angioplasty (Primary)  He denies angina, continue aspirin 81 mg daily.    2. Chronic heart failure with preserved ejection fraction (HFpEF)  Symptomatically stable, euvolemic on exam, continue lasix 80 mg--two tablets in the morning and 1 tablet in the evening and spironolactone 100 mg twice daily. We discussed reducing fluid and sodium intake, compression socks, and daily weight.    3. Essential hypertension  Currently controlled and without adverse effects from medication, continue carvedilol 12.5 mg twice daily.    4. Hyperlipidemia LDL goal <70  Last lipid panel was 9/4/24 with LDL 33 which is within goal range, continue atorvastatin 40 mg daily.            Follow Up   Return in about 6 months (around 3/24/2025) for Follow up with Dr Kramer.    Patient was given instructions and counseling regarding his condition or for health maintenance advice. Please see specific information pulled into the AVS if appropriate.     Emmett Pandya  reports that he quit smoking about 17 years ago. His smoking use included cigarettes. He started smoking about 57 years ago. He has a 79.9 pack-year smoking history. He has never used smokeless tobacco.            Haven Helms, LOTUS  09/24/24  14:09 EDT    Dictated Utilizing Dragon Dictation    "

## 2024-09-26 ENCOUNTER — TELEPHONE (OUTPATIENT)
Dept: CARDIOLOGY | Facility: CLINIC | Age: 74
End: 2024-09-26
Payer: MEDICARE

## 2024-09-27 RX ORDER — FUROSEMIDE 80 MG
TABLET ORAL
Qty: 270 TABLET | Refills: 1 | Status: SHIPPED | OUTPATIENT
Start: 2024-09-27

## 2024-09-27 RX ORDER — FUROSEMIDE 80 MG
240 TABLET ORAL DAILY
Qty: 270 TABLET | Refills: 1 | Status: SHIPPED | OUTPATIENT
Start: 2024-09-27 | End: 2024-09-27

## 2024-10-30 NOTE — PLAN OF CARE
Airway  Date/Time: 10/30/2024 2:04 PM  Urgency: elective    Difficult airway    General Information and Staff    Patient location during procedure: OR  Anesthesiologist: Edlon Hernandez MD  Performed: anesthesiologist   Performed by: Eldon Hernandez MD  Authorized by: Eldon Hernandez MD      Indications and Patient Condition  Indications for airway management: anesthesia  Sedation level: deep  Preoxygenated: yes  Patient position: sniffing  Mask difficulty assessment: 1 - vent by mask    Final Airway Details  Final airway type: endotracheal airway      Successful airway: ETT  Cuffed: yes   Successful intubation technique: Video laryngoscopy  Endotracheal tube insertion site: oral  Blade: GlideScope  Blade size: #4  ETT size (mm): 8.0    Cormack-Lehane Classification: grade IIA - partial view of glottis  Placement verified by: capnometry   Measured from: lips  ETT to lips (cm): 20  Number of attempts at approach: 1         Goal Outcome Evaluation:           Progress: improving  Outcome Evaluation: Patient alert and oriented x4. No complaints per the patient this shift. Slept upright in bedside recliner. Blood glucose monitored as ordered, supplemental insulin administered per MAR. Continuous normal saline infusing at 125ml/hr.

## 2025-07-17 ENCOUNTER — OFFICE VISIT (OUTPATIENT)
Dept: CARDIOLOGY | Facility: CLINIC | Age: 75
End: 2025-07-17
Payer: MEDICARE

## 2025-07-17 VITALS
HEART RATE: 66 BPM | BODY MASS INDEX: 37.46 KG/M2 | HEIGHT: 72 IN | WEIGHT: 276.6 LBS | SYSTOLIC BLOOD PRESSURE: 123 MMHG | DIASTOLIC BLOOD PRESSURE: 32 MMHG

## 2025-07-17 DIAGNOSIS — I25.10 CAD S/P PERCUTANEOUS CORONARY ANGIOPLASTY: ICD-10-CM

## 2025-07-17 DIAGNOSIS — E78.5 HYPERLIPIDEMIA LDL GOAL <70: ICD-10-CM

## 2025-07-17 DIAGNOSIS — I50.32 CHRONIC HEART FAILURE WITH PRESERVED EJECTION FRACTION (HFPEF): Primary | ICD-10-CM

## 2025-07-17 DIAGNOSIS — Z98.61 CAD S/P PERCUTANEOUS CORONARY ANGIOPLASTY: ICD-10-CM

## 2025-07-17 DIAGNOSIS — I10 ESSENTIAL HYPERTENSION: ICD-10-CM

## 2025-07-17 PROBLEM — N18.4 CRF (CHRONIC RENAL FAILURE), STAGE 4 (SEVERE): Status: ACTIVE | Noted: 2021-12-16

## 2025-07-17 RX ORDER — POTASSIUM CHLORIDE 750 MG/1
20 TABLET, EXTENDED RELEASE ORAL
COMMUNITY
Start: 2025-04-09

## 2025-07-17 RX ORDER — PEN NEEDLE, DIABETIC 29 G X1/2"
NEEDLE, DISPOSABLE MISCELLANEOUS DAILY
COMMUNITY
Start: 2025-05-23

## 2025-07-17 RX ORDER — INSULIN GLARGINE 100 [IU]/ML
INJECTION, SOLUTION SUBCUTANEOUS
COMMUNITY

## 2025-07-17 NOTE — PROGRESS NOTES
"Chief Complaint  Follow-up, chronic heart failure, Coronary Artery Disease, Hyperlipidemia, and Hypertension (No new complaints)    Subjective    Patient has been doing stably with no change in his breathing ability since last visit.  Denies any anginal chest pain his weight up is mildly about 7 pounds since last visit.  He has chronic lower extremity edema  Past Medical History:   Diagnosis Date    CHF (congestive heart failure)     Chronic heart failure with preserved ejection fraction (HFpEF) 12/3/2021    Chronic kidney disease     Hyperlipidemia     Hypertension          Current Outpatient Medications:     allopurinol (ZYLOPRIM) 100 MG tablet, Take 3 tablets by mouth Daily., Disp: , Rfl:     aspirin 81 MG EC tablet, Take 1 tablet by mouth Daily., Disp: , Rfl:     atorvastatin (LIPITOR) 40 MG tablet, TAKE 1 TABLET BY MOUTH EVERY DAY, Disp: 90 tablet, Rfl: 1    BD Insulin Syringe Ultrafine 31G X 5/16\" 0.3 ML misc, Daily., Disp: , Rfl:     carvedilol (COREG) 12.5 MG tablet, TAKE 1 TABLET BY MOUTH TWICE A DAY, Disp: 180 tablet, Rfl: 2    furosemide (LASIX) 80 MG tablet, Take 2 tablets by mouth each morning and 1 tablet by mouth nightly, Disp: 270 tablet, Rfl: 1    glipizide (GLUCOTROL) 5 MG tablet, Take 1 tablet by mouth Daily., Disp: , Rfl:     Lantus 100 UNIT/ML injection, ADMINISTER 20 UNITS UNDER THE SKIN DAILY. DISCARD REMAINDER OF VIAL 28 DAYS AFTER PUNCTURE, Disp: , Rfl:     potassium chloride 10 MEQ CR tablet, Take 2 tablets by mouth., Disp: , Rfl:     spironolactone (ALDACTONE) 100 MG tablet, Take 1 tablet by mouth 2 (Two) Times a Day. (Patient taking differently: Take 1 tablet by mouth 2 (Two) Times a Day. Pt states only takes half tablet twice daily), Disp: 180 tablet, Rfl: 1    Medications Discontinued During This Encounter   Medication Reason    dapagliflozin Propanediol 10 MG tablet     Ozempic, 0.25 or 0.5 MG/DOSE, 2 MG/3ML solution pen-injector     vitamin D (ERGOCALCIFEROL) 1.25 MG (26625 UT) " "capsule capsule      No Known Allergies     Social History     Tobacco Use    Smoking status: Former     Current packs/day: 0.00     Average packs/day: 2.0 packs/day for 39.9 years (79.9 ttl pk-yrs)     Types: Cigarettes     Start date:      Quit date: 2006     Years since quittin.6    Smokeless tobacco: Never   Vaping Use    Vaping status: Never Used   Substance Use Topics    Alcohol use: Never    Drug use: Never       Family History   Problem Relation Age of Onset    No Known Problems Mother     No Known Problems Father         Objective     BP (!) 123/32   Pulse 66   Ht 182.9 cm (72.01\")   Wt 125 kg (276 lb 9.6 oz)   BMI 37.51 kg/m²       Physical Exam    General Appearance:   no acute distress  Alert and oriented x3  HENT:   lips not cyanotic  Atraumatic  Neck:  No jvd   supple  Respiratory:  no respiratory distress  normal breath sounds  no rales  Cardiovascular:  Regular rate and rhythm  no S3, no S4   no murmur  no rub  Extremities  No cyanosis  lower extremity edema: +2 skin:   warm, dry  No rashes      Result Review :     proBNP   Date Value Ref Range Status   2023 195.9 0.0 - 900.0 pg/mL Final          Lab Results   Component Value Date    TSH 3.000 2019      Lab Results   Component Value Date    FREET4 1.0 2019      No results found for: \"DDIMERQUANT\"  Magnesium   Date Value Ref Range Status   2023 1.9 1.6 - 2.4 mg/dL Final      No results found for: \"DIGOXIN\"   Lab Results   Component Value Date    TROPONINT 46 (H) 2023                                                       No results found for: \"POCTROP\"                   Diagnoses and all orders for this visit:    1. Chronic heart failure with preserved ejection fraction (HFpEF) (Primary)  Assessment & Plan:  Continue patient on chronic Lasix 80 mg once a day and Aldactone 50 twice daily currently being followed with nephrology please help with maintenance of his volume status as well      2. CAD S/P " percutaneous coronary angioplasty  Assessment & Plan:  Patient is doing well no ongoing angina continue with chronic aspirin 81 mg daily        3. Hyperlipidemia LDL goal <70  Assessment & Plan:  Continue Lipitor 40 nightly LDL goal       4. Essential hypertension  Assessment & Plan:  Blood pressure control continue with Coreg 12.5 twice daily and Aldactone 50 daily               Follow Up     Return in about 6 months (around 1/17/2026) for Follow with Haven Helms.          Patient was given instructions and counseling regarding his condition or for health maintenance advice. Please see specific information pulled into the AVS if appropriate.

## 2025-07-17 NOTE — ASSESSMENT & PLAN NOTE
Continue patient on chronic Lasix 80 mg once a day and Aldactone 50 twice daily currently being followed with nephrology please help with maintenance of his volume status as well